# Patient Record
Sex: FEMALE | Race: WHITE | HISPANIC OR LATINO | URBAN - METROPOLITAN AREA
[De-identification: names, ages, dates, MRNs, and addresses within clinical notes are randomized per-mention and may not be internally consistent; named-entity substitution may affect disease eponyms.]

---

## 2019-06-02 ENCOUNTER — EMERGENCY (EMERGENCY)
Facility: HOSPITAL | Age: 24
LOS: 0 days | Discharge: HOME | End: 2019-06-03
Attending: EMERGENCY MEDICINE
Payer: MEDICAID

## 2019-06-02 VITALS
RESPIRATION RATE: 20 BRPM | OXYGEN SATURATION: 99 % | HEIGHT: 62 IN | DIASTOLIC BLOOD PRESSURE: 87 MMHG | TEMPERATURE: 101 F | WEIGHT: 158.07 LBS | HEART RATE: 110 BPM | SYSTOLIC BLOOD PRESSURE: 147 MMHG

## 2019-06-02 DIAGNOSIS — R52 PAIN, UNSPECIFIED: ICD-10-CM

## 2019-06-02 DIAGNOSIS — R10.9 UNSPECIFIED ABDOMINAL PAIN: ICD-10-CM

## 2019-06-02 DIAGNOSIS — R50.9 FEVER, UNSPECIFIED: ICD-10-CM

## 2019-06-02 DIAGNOSIS — N10 ACUTE PYELONEPHRITIS: ICD-10-CM

## 2019-06-02 LAB
APPEARANCE UR: ABNORMAL
APTT BLD: 29.7 SEC — SIGNIFICANT CHANGE UP (ref 28.5–37)
BASOPHILS # BLD AUTO: 0.02 K/UL — SIGNIFICANT CHANGE UP (ref 0–0.2)
BASOPHILS NFR BLD AUTO: 0.2 % — SIGNIFICANT CHANGE UP (ref 0–2)
BILIRUB UR-MCNC: NEGATIVE — SIGNIFICANT CHANGE UP
COLOR SPEC: YELLOW — SIGNIFICANT CHANGE UP
DIFF PNL FLD: ABNORMAL
EOSINOPHIL # BLD AUTO: 0.01 K/UL — SIGNIFICANT CHANGE UP (ref 0–0.5)
EOSINOPHIL NFR BLD AUTO: 0.1 % — SIGNIFICANT CHANGE UP (ref 0–6)
GLUCOSE UR QL: NEGATIVE MG/DL — SIGNIFICANT CHANGE UP
HCT VFR BLD CALC: 39.9 % — SIGNIFICANT CHANGE UP (ref 34.5–45)
HGB BLD-MCNC: 12.9 G/DL — SIGNIFICANT CHANGE UP (ref 11.5–15.5)
IMM GRANULOCYTES NFR BLD AUTO: 0.3 % — SIGNIFICANT CHANGE UP (ref 0–1.5)
INR BLD: 1.25 RATIO — HIGH (ref 0.88–1.16)
KETONES UR-MCNC: NEGATIVE — SIGNIFICANT CHANGE UP
LEUKOCYTE ESTERASE UR-ACNC: ABNORMAL
LYMPHOCYTES # BLD AUTO: 0.88 K/UL — LOW (ref 1–3.3)
LYMPHOCYTES # BLD AUTO: 9.6 % — LOW (ref 13–44)
MCHC RBC-ENTMCNC: 26.2 PG — LOW (ref 27–34)
MCHC RBC-ENTMCNC: 32.3 GM/DL — SIGNIFICANT CHANGE UP (ref 32–36)
MCV RBC AUTO: 81.1 FL — SIGNIFICANT CHANGE UP (ref 80–100)
MONOCYTES # BLD AUTO: 0.47 K/UL — SIGNIFICANT CHANGE UP (ref 0–0.9)
MONOCYTES NFR BLD AUTO: 5.1 % — SIGNIFICANT CHANGE UP (ref 2–14)
NEUTROPHILS # BLD AUTO: 7.78 K/UL — HIGH (ref 1.8–7.4)
NEUTROPHILS NFR BLD AUTO: 84.7 % — HIGH (ref 43–77)
NITRITE UR-MCNC: NEGATIVE — SIGNIFICANT CHANGE UP
NRBC # BLD: 0 /100 WBCS — SIGNIFICANT CHANGE UP (ref 0–0)
PH UR: 7 — SIGNIFICANT CHANGE UP (ref 5–8)
PLATELET # BLD AUTO: 240 K/UL — SIGNIFICANT CHANGE UP (ref 150–400)
PROT UR-MCNC: 30 MG/DL
PROTHROM AB SERPL-ACNC: 14.1 SEC — HIGH (ref 10–12.9)
RBC # BLD: 4.92 M/UL — SIGNIFICANT CHANGE UP (ref 3.8–5.2)
RBC # FLD: 13.3 % — SIGNIFICANT CHANGE UP (ref 10.3–14.5)
SP GR SPEC: 1 — LOW (ref 1.01–1.02)
UROBILINOGEN FLD QL: NEGATIVE MG/DL — SIGNIFICANT CHANGE UP
WBC # BLD: 9.19 K/UL — SIGNIFICANT CHANGE UP (ref 3.8–10.5)
WBC # FLD AUTO: 9.19 K/UL — SIGNIFICANT CHANGE UP (ref 3.8–10.5)

## 2019-06-02 PROCEDURE — 99284 EMERGENCY DEPT VISIT MOD MDM: CPT | Mod: 25

## 2019-06-02 RX ORDER — CEFTRIAXONE 500 MG/1
1 INJECTION, POWDER, FOR SOLUTION INTRAMUSCULAR; INTRAVENOUS ONCE
Refills: 0 | Status: COMPLETED | OUTPATIENT
Start: 2019-06-02 | End: 2019-06-02

## 2019-06-02 RX ORDER — SODIUM CHLORIDE 9 MG/ML
2000 INJECTION INTRAMUSCULAR; INTRAVENOUS; SUBCUTANEOUS ONCE
Refills: 0 | Status: COMPLETED | OUTPATIENT
Start: 2019-06-02 | End: 2019-06-02

## 2019-06-02 RX ORDER — ACETAMINOPHEN 500 MG
975 TABLET ORAL ONCE
Refills: 0 | Status: COMPLETED | OUTPATIENT
Start: 2019-06-02 | End: 2019-06-02

## 2019-06-02 RX ORDER — IOHEXOL 300 MG/ML
30 INJECTION, SOLUTION INTRAVENOUS ONCE
Refills: 0 | Status: COMPLETED | OUTPATIENT
Start: 2019-06-02 | End: 2019-06-02

## 2019-06-02 RX ADMIN — IOHEXOL 30 MILLILITER(S): 300 INJECTION, SOLUTION INTRAVENOUS at 23:44

## 2019-06-02 NOTE — ED PROVIDER NOTE - CLINICAL SUMMARY MEDICAL DECISION MAKING FREE TEXT BOX
fever and dysuria - suspect uti, possibly pyelo. will start fluids and give abx. fever and dysuria - suspect uti, possibly pyelo. will start fluids and give abx. labs reassuring. given non tender, no indication for ct. will start abx. tolerating po and nontoxic. ok for dc home

## 2019-06-02 NOTE — ED PROVIDER NOTE - OBJECTIVE STATEMENT
Pertinent PMH/PSH/FHx/SHx and Review of Systems contained within:  23f no med hx pw generalized body aches, fever, polyuria  and dysuria. symptoms x2 days. nausea without vomiting. notes weakness, feeling like she is going to pass out. no ha ,vison loss, rhinorrhea, rash, bleeding, cp, sob, cough.   patient did not take anything for symptoms  Fh and Sh not otherwise contributory  ROS otherwise negative

## 2019-06-02 NOTE — ED ADULT TRIAGE NOTE - CHIEF COMPLAINT QUOTE
C/O Generalized body aches x 4 days . my belly button feels funny , nausea and vomited x1 today denies any medical history

## 2019-06-02 NOTE — ED ADULT NURSE NOTE - OBJECTIVE STATEMENT
ao x3 , ambulatory to the ED c/o generalized body aches , umbilical area pain with n/v ,. evaluated by ED attending lab drawn and sent , patientdrinking po contrast in preparation for ct abdomen/pelvis with PO AND IV CONTRAST . will continue to monitor

## 2019-06-02 NOTE — ED ADULT NURSE NOTE - NSIMPLEMENTINTERV_GEN_ALL_ED
Implemented All Universal Safety Interventions:  [place] to call system. Call bell, personal items and telephone within reach. Instruct patient to call for assistance. Room bathroom lighting operational. Non-slip footwear when patient is off stretcher. Physically safe environment: no spills, clutter or unnecessary equipment. Stretcher in lowest position, wheels locked, appropriate side rails in place.

## 2019-06-02 NOTE — ED ADULT NURSE NOTE - CAS TRG GENERAL NORM CIRC DET
No visible significant external bleeding/Strong peripheral pulses/Bounding peripheral pulses/Capillary refill less/equal to 2 seconds

## 2019-06-03 VITALS
OXYGEN SATURATION: 98 % | DIASTOLIC BLOOD PRESSURE: 79 MMHG | HEART RATE: 90 BPM | SYSTOLIC BLOOD PRESSURE: 118 MMHG | TEMPERATURE: 100 F | RESPIRATION RATE: 16 BRPM

## 2019-06-03 LAB
ALBUMIN SERPL ELPH-MCNC: 3.8 G/DL — SIGNIFICANT CHANGE UP (ref 3.3–5)
ALP SERPL-CCNC: 73 U/L — SIGNIFICANT CHANGE UP (ref 40–120)
ALT FLD-CCNC: 18 U/L — SIGNIFICANT CHANGE UP (ref 12–78)
ANION GAP SERPL CALC-SCNC: 11 MMOL/L — SIGNIFICANT CHANGE UP (ref 5–17)
AST SERPL-CCNC: 18 U/L — SIGNIFICANT CHANGE UP (ref 15–37)
BACTERIA # UR AUTO: ABNORMAL
BILIRUB SERPL-MCNC: 0.7 MG/DL — SIGNIFICANT CHANGE UP (ref 0.2–1.2)
BLD GP AB SCN SERPL QL: SIGNIFICANT CHANGE UP
BUN SERPL-MCNC: 13 MG/DL — SIGNIFICANT CHANGE UP (ref 7–23)
CALCIUM SERPL-MCNC: 8.3 MG/DL — LOW (ref 8.5–10.1)
CHLORIDE SERPL-SCNC: 102 MMOL/L — SIGNIFICANT CHANGE UP (ref 96–108)
CO2 SERPL-SCNC: 25 MMOL/L — SIGNIFICANT CHANGE UP (ref 22–31)
COMMENT - URINE: SIGNIFICANT CHANGE UP
CREAT SERPL-MCNC: 0.88 MG/DL — SIGNIFICANT CHANGE UP (ref 0.5–1.3)
EPI CELLS # UR: SIGNIFICANT CHANGE UP
GLUCOSE SERPL-MCNC: 100 MG/DL — HIGH (ref 70–99)
HCG SERPL-ACNC: 1 MIU/ML — SIGNIFICANT CHANGE UP
LIDOCAIN IGE QN: 115 U/L — SIGNIFICANT CHANGE UP (ref 73–393)
POTASSIUM SERPL-MCNC: 3.7 MMOL/L — SIGNIFICANT CHANGE UP (ref 3.5–5.3)
POTASSIUM SERPL-SCNC: 3.7 MMOL/L — SIGNIFICANT CHANGE UP (ref 3.5–5.3)
PROT SERPL-MCNC: 8.4 GM/DL — HIGH (ref 6–8.3)
RBC CASTS # UR COMP ASSIST: ABNORMAL /HPF (ref 0–4)
SODIUM SERPL-SCNC: 138 MMOL/L — SIGNIFICANT CHANGE UP (ref 135–145)
WBC UR QL: >50

## 2019-06-03 RX ORDER — CEFUROXIME AXETIL 250 MG
1 TABLET ORAL
Qty: 14 | Refills: 0
Start: 2019-06-03 | End: 2019-06-09

## 2019-06-03 RX ADMIN — CEFTRIAXONE 1 GRAM(S): 500 INJECTION, POWDER, FOR SOLUTION INTRAMUSCULAR; INTRAVENOUS at 01:56

## 2019-06-03 RX ADMIN — SODIUM CHLORIDE 2000 MILLILITER(S): 9 INJECTION INTRAMUSCULAR; INTRAVENOUS; SUBCUTANEOUS at 00:02

## 2019-06-03 RX ADMIN — Medication 975 MILLIGRAM(S): at 01:56

## 2019-06-03 RX ADMIN — SODIUM CHLORIDE 2000 MILLILITER(S): 9 INJECTION INTRAMUSCULAR; INTRAVENOUS; SUBCUTANEOUS at 01:56

## 2019-06-03 RX ADMIN — CEFTRIAXONE 100 GRAM(S): 500 INJECTION, POWDER, FOR SOLUTION INTRAMUSCULAR; INTRAVENOUS at 00:02

## 2019-06-03 RX ADMIN — Medication 975 MILLIGRAM(S): at 00:03

## 2020-01-16 NOTE — ED ADULT NURSE NOTE - NS ED NURSE LEVEL OF CONSCIOUSNESS AFFECT
Detail Level: Detailed Strength: Nataly Post-Care Instructions: I reviewed with the patient in detail post-care instructions. The patient understands that the treated areas should be washed off 8 hours after application. Consent: The patient's consent was obtained including but not limited to risks of crusting, scabbing, scarring, blistering, darker or lighter pigmentary change, recurrence, incomplete removal and infection. Include Z78.9 (Other Specified Conditions Influencing Health Status) As An Associated Diagnosis?: Yes Curette Before Application?: No Medical Necessity Clause: This procedure was medically necessary because the lesions that were treated were: Curette Text: Prior to application of cantharidin the lesion was significantly pared Medical Necessity Information: It is in your best interest to select a reason for this procedure from the list below. All of these items fulfill various CMS LCD requirements except the new and changing color options. Appropriate/Calm

## 2022-02-25 ENCOUNTER — EMERGENCY (EMERGENCY)
Facility: HOSPITAL | Age: 27
LOS: 0 days | Discharge: HOME | End: 2022-02-25
Attending: STUDENT IN AN ORGANIZED HEALTH CARE EDUCATION/TRAINING PROGRAM
Payer: MEDICAID

## 2022-02-25 VITALS
RESPIRATION RATE: 18 BRPM | OXYGEN SATURATION: 99 % | HEIGHT: 62 IN | TEMPERATURE: 99 F | DIASTOLIC BLOOD PRESSURE: 83 MMHG | WEIGHT: 158.07 LBS | SYSTOLIC BLOOD PRESSURE: 145 MMHG | HEART RATE: 61 BPM

## 2022-02-25 VITALS
SYSTOLIC BLOOD PRESSURE: 119 MMHG | OXYGEN SATURATION: 99 % | HEART RATE: 71 BPM | RESPIRATION RATE: 15 BRPM | DIASTOLIC BLOOD PRESSURE: 75 MMHG | TEMPERATURE: 98 F

## 2022-02-25 DIAGNOSIS — K80.50 CALCULUS OF BILE DUCT WITHOUT CHOLANGITIS OR CHOLECYSTITIS WITHOUT OBSTRUCTION: ICD-10-CM

## 2022-02-25 DIAGNOSIS — R10.13 EPIGASTRIC PAIN: ICD-10-CM

## 2022-02-25 DIAGNOSIS — R11.0 NAUSEA: ICD-10-CM

## 2022-02-25 DIAGNOSIS — Z20.822 CONTACT WITH AND (SUSPECTED) EXPOSURE TO COVID-19: ICD-10-CM

## 2022-02-25 DIAGNOSIS — R10.11 RIGHT UPPER QUADRANT PAIN: ICD-10-CM

## 2022-02-25 LAB
ALBUMIN SERPL ELPH-MCNC: 3.4 G/DL — SIGNIFICANT CHANGE UP (ref 3.3–5)
ALP SERPL-CCNC: 73 U/L — SIGNIFICANT CHANGE UP (ref 40–120)
ALT FLD-CCNC: 30 U/L — SIGNIFICANT CHANGE UP (ref 12–78)
ANION GAP SERPL CALC-SCNC: 5 MMOL/L — SIGNIFICANT CHANGE UP (ref 5–17)
APTT BLD: 30.9 SEC — SIGNIFICANT CHANGE UP (ref 27.5–35.5)
AST SERPL-CCNC: 17 U/L — SIGNIFICANT CHANGE UP (ref 15–37)
BASOPHILS # BLD AUTO: 0.02 K/UL — SIGNIFICANT CHANGE UP (ref 0–0.2)
BASOPHILS NFR BLD AUTO: 0.2 % — SIGNIFICANT CHANGE UP (ref 0–2)
BILIRUB SERPL-MCNC: 0.3 MG/DL — SIGNIFICANT CHANGE UP (ref 0.2–1.2)
BLD GP AB SCN SERPL QL: SIGNIFICANT CHANGE UP
BUN SERPL-MCNC: 12 MG/DL — SIGNIFICANT CHANGE UP (ref 7–23)
CALCIUM SERPL-MCNC: 9.1 MG/DL — SIGNIFICANT CHANGE UP (ref 8.5–10.1)
CHLORIDE SERPL-SCNC: 107 MMOL/L — SIGNIFICANT CHANGE UP (ref 96–108)
CO2 SERPL-SCNC: 27 MMOL/L — SIGNIFICANT CHANGE UP (ref 22–31)
CREAT SERPL-MCNC: 0.7 MG/DL — SIGNIFICANT CHANGE UP (ref 0.5–1.3)
EOSINOPHIL # BLD AUTO: 0.08 K/UL — SIGNIFICANT CHANGE UP (ref 0–0.5)
EOSINOPHIL NFR BLD AUTO: 0.8 % — SIGNIFICANT CHANGE UP (ref 0–6)
FLUAV AG NPH QL: SIGNIFICANT CHANGE UP
FLUBV AG NPH QL: SIGNIFICANT CHANGE UP
GLUCOSE SERPL-MCNC: 134 MG/DL — HIGH (ref 70–99)
HCG SERPL-ACNC: <1 MIU/ML — SIGNIFICANT CHANGE UP
HCT VFR BLD CALC: 39.6 % — SIGNIFICANT CHANGE UP (ref 34.5–45)
HGB BLD-MCNC: 12.9 G/DL — SIGNIFICANT CHANGE UP (ref 11.5–15.5)
IMM GRANULOCYTES NFR BLD AUTO: 0.3 % — SIGNIFICANT CHANGE UP (ref 0–1.5)
INR BLD: 1.01 RATIO — SIGNIFICANT CHANGE UP (ref 0.88–1.16)
LIDOCAIN IGE QN: 142 U/L — SIGNIFICANT CHANGE UP (ref 73–393)
LYMPHOCYTES # BLD AUTO: 2.31 K/UL — SIGNIFICANT CHANGE UP (ref 1–3.3)
LYMPHOCYTES # BLD AUTO: 23.2 % — SIGNIFICANT CHANGE UP (ref 13–44)
MCHC RBC-ENTMCNC: 26.7 PG — LOW (ref 27–34)
MCHC RBC-ENTMCNC: 32.6 G/DL — SIGNIFICANT CHANGE UP (ref 32–36)
MCV RBC AUTO: 82 FL — SIGNIFICANT CHANGE UP (ref 80–100)
MONOCYTES # BLD AUTO: 0.35 K/UL — SIGNIFICANT CHANGE UP (ref 0–0.9)
MONOCYTES NFR BLD AUTO: 3.5 % — SIGNIFICANT CHANGE UP (ref 2–14)
NEUTROPHILS # BLD AUTO: 7.18 K/UL — SIGNIFICANT CHANGE UP (ref 1.8–7.4)
NEUTROPHILS NFR BLD AUTO: 72 % — SIGNIFICANT CHANGE UP (ref 43–77)
NRBC # BLD: 0 /100 WBCS — SIGNIFICANT CHANGE UP (ref 0–0)
PLATELET # BLD AUTO: 232 K/UL — SIGNIFICANT CHANGE UP (ref 150–400)
POTASSIUM SERPL-MCNC: 4.6 MMOL/L — SIGNIFICANT CHANGE UP (ref 3.5–5.3)
POTASSIUM SERPL-SCNC: 4.6 MMOL/L — SIGNIFICANT CHANGE UP (ref 3.5–5.3)
PROT SERPL-MCNC: 7.6 GM/DL — SIGNIFICANT CHANGE UP (ref 6–8.3)
PROTHROM AB SERPL-ACNC: 12.1 SEC — SIGNIFICANT CHANGE UP (ref 10.5–13.4)
RBC # BLD: 4.83 M/UL — SIGNIFICANT CHANGE UP (ref 3.8–5.2)
RBC # FLD: 13.2 % — SIGNIFICANT CHANGE UP (ref 10.3–14.5)
SARS-COV-2 RNA SPEC QL NAA+PROBE: SIGNIFICANT CHANGE UP
SODIUM SERPL-SCNC: 139 MMOL/L — SIGNIFICANT CHANGE UP (ref 135–145)
WBC # BLD: 9.97 K/UL — SIGNIFICANT CHANGE UP (ref 3.8–10.5)
WBC # FLD AUTO: 9.97 K/UL — SIGNIFICANT CHANGE UP (ref 3.8–10.5)

## 2022-02-25 PROCEDURE — 99285 EMERGENCY DEPT VISIT HI MDM: CPT

## 2022-02-25 PROCEDURE — 76705 ECHO EXAM OF ABDOMEN: CPT | Mod: 26

## 2022-02-25 RX ORDER — IBUPROFEN 200 MG
1 TABLET ORAL
Qty: 30 | Refills: 0
Start: 2022-02-25 | End: 2022-03-01

## 2022-02-25 RX ORDER — ONDANSETRON 8 MG/1
1 TABLET, FILM COATED ORAL
Qty: 10 | Refills: 0
Start: 2022-02-25 | End: 2022-03-01

## 2022-02-25 RX ORDER — FAMOTIDINE 10 MG/ML
1 INJECTION INTRAVENOUS
Qty: 30 | Refills: 0
Start: 2022-02-25 | End: 2022-03-26

## 2022-02-25 RX ORDER — ONDANSETRON 8 MG/1
4 TABLET, FILM COATED ORAL ONCE
Refills: 0 | Status: COMPLETED | OUTPATIENT
Start: 2022-02-25 | End: 2022-02-25

## 2022-02-25 RX ORDER — KETOROLAC TROMETHAMINE 30 MG/ML
15 SYRINGE (ML) INJECTION ONCE
Refills: 0 | Status: DISCONTINUED | OUTPATIENT
Start: 2022-02-25 | End: 2022-02-25

## 2022-02-25 RX ORDER — FAMOTIDINE 10 MG/ML
20 INJECTION INTRAVENOUS ONCE
Refills: 0 | Status: COMPLETED | OUTPATIENT
Start: 2022-02-25 | End: 2022-02-25

## 2022-02-25 RX ADMIN — ONDANSETRON 4 MILLIGRAM(S): 8 TABLET, FILM COATED ORAL at 09:14

## 2022-02-25 RX ADMIN — Medication 15 MILLIGRAM(S): at 10:50

## 2022-02-25 RX ADMIN — Medication 15 MILLIGRAM(S): at 09:13

## 2022-02-25 RX ADMIN — FAMOTIDINE 20 MILLIGRAM(S): 10 INJECTION INTRAVENOUS at 09:14

## 2022-02-25 NOTE — ED PROVIDER NOTE - OBJECTIVE STATEMENT
26F w/ PMH 26F pw epigastric / RUQ pain radiating into back onset 0100 this AM. Pt describes as severe, squeezing pain, + SOB 2/2 pain. Pt reports nausea, NBNB emesis x 1 this AM. + constipation. Denies F/C, CP, cough, UTI sx, LE pain / swelling. Pt reports hx similar pain in past w/ gallstones.     PMH gallstones, NKDA, no meds, no PSH, LMP 2wks ago.

## 2022-02-25 NOTE — ED ADULT NURSE REASSESSMENT NOTE - NS ED NURSE REASSESS COMMENT FT1
pt seen and re-evaluated by ED attending d/c ready in stable condition ganga removed   d/c instruction given and explained to pt and verbalized understanding pt left Ed ambulatory in NAD

## 2022-02-25 NOTE — ED PROVIDER NOTE - CLINICAL SUMMARY MEDICAL DECISION MAKING FREE TEXT BOX
26F pw 26F pw severe squeezing RUQ pain w/ rad into back onset 0100 this AM. AFVSS. Pt appears uncomfortable but otherwise well appearing. Plan: CBC, CMP, lipase, HCG, T&S, coags, RUQ US, Flu/COVID. Give Toradol, Zofran, Pepcid. Re-eval. Clinical presentation most c/w biliary colic v acute francy.

## 2022-02-25 NOTE — ED PROVIDER NOTE - PROVIDER TOKENS
PROVIDER:[TOKEN:[[id number]],FOLLOWUP:[7-10 Days]],PROVIDER:[TOKEN:[[id number]],FOLLOWUP:[7-10 Days]]

## 2022-02-25 NOTE — ED PROVIDER NOTE - CARE PROVIDER_API CALL
Trey Shipley)  Surgery  [address] [place] [address]  Phone: [phone]  Fax: [phone]  Follow Up Time: 7-10 Days    [facility]  [address], [address]  [place] [address]  Phone: [phone]  Fax: [phone]  Follow Up Time: 7-10 Days

## 2022-02-25 NOTE — ED PROVIDER NOTE - PHYSICAL EXAMINATION
GEN: Awake, alert, interactive, NAD.  HEAD AND NECK: NC/AT. Airway patent. Neck supple.   EYES:  Clear b/l. EOMI. PERRL.   ENT: Moist mucus membranes.   CARDIAC: Regular rate, regular rhythm. No evident pedal edema.    RESP/CHEST: Normal respiratory effort with no use of accessory muscles or retractions. Clear throughout on auscultation.  ABD: Soft, non-distended, non-tender. No rebound, no guarding.   BACK: No midline spinal TTP. No CVAT.   EXTREMITIES: Moving all extremities with no apparent deformities.   SKIN: Warm, dry, intact normal color. No rash.   NEURO: AOx3, CN II-XII grossly intact, no focal deficits.   PSYCH: Appropriate mood and affect. GEN: Awake, alert, interactive, NAD.  HEAD AND NECK: NC/AT. Airway patent. Neck supple.   EYES:  Clear b/l. EOMI. PERRL.   ENT: Moist mucus membranes.   CARDIAC: Regular rate, regular rhythm. No evident pedal edema.    RESP/CHEST: Normal respiratory effort with no use of accessory muscles or retractions. Clear throughout on auscultation.  ABD: Soft, non-distended, + TTP RUQ. No rebound, no guarding.   BACK: No midline spinal TTP. No CVAT.   EXTREMITIES: Moving all extremities with no apparent deformities.   SKIN: Warm, dry, intact normal color. No rash.   NEURO: AOx3, CN II-XII grossly intact, no focal deficits.   PSYCH: Appropriate mood and affect.

## 2022-02-25 NOTE — ED PROVIDER NOTE - PATIENT PORTAL LINK FT
You can access the FollowMyHealth Patient Portal offered by [facility] by registering at the following website: [email or web address]. By joining [email or web address] FollowMyHealth portal, you will also be able to view your health information using other applications (apps) compatible with our system.

## 2022-02-25 NOTE — ED PROVIDER NOTE - PROGRESS NOTE DETAILS
Pt w/ improvement in symptoms w/ ED meds, resting comfortably, in NAD. Partner at bedside. WBC, lipase, LFTs w/o significant abnormalities. RUQ w/ + cholelithiasis, no evidence acute cholecystitis. Stable for d/c home. Given and recommend outpatient f/u w/ Gen Surg, GI. Return signs / symptoms d/w pt. She understands / agrees w/ this plan.

## 2022-11-25 NOTE — ED ADULT NURSE NOTE - BOWEL SOUNDS RUQ
present cr increased from 1.23-->1.42  pt says he had not been drinking enough fluids, will avoid IVF as may worsen Pleural effusion  pt encouraged to drink about 8 glasses of fluids   pt advised to f/u with PMD on Monday for repeat blood work to monitor serum creatinine

## 2023-07-15 ENCOUNTER — EMERGENCY (EMERGENCY)
Facility: HOSPITAL | Age: 28
LOS: 0 days | Discharge: ANOTHER HOSPITAL | End: 2023-07-16
Attending: STUDENT IN AN ORGANIZED HEALTH CARE EDUCATION/TRAINING PROGRAM
Payer: MEDICAID

## 2023-07-15 VITALS
DIASTOLIC BLOOD PRESSURE: 71 MMHG | OXYGEN SATURATION: 99 % | TEMPERATURE: 99 F | RESPIRATION RATE: 18 BRPM | SYSTOLIC BLOOD PRESSURE: 117 MMHG | HEART RATE: 90 BPM

## 2023-07-15 DIAGNOSIS — Z3A.22 22 WEEKS GESTATION OF PREGNANCY: ICD-10-CM

## 2023-07-15 DIAGNOSIS — R10.9 UNSPECIFIED ABDOMINAL PAIN: ICD-10-CM

## 2023-07-15 DIAGNOSIS — O23.02 INFECTIONS OF KIDNEY IN PREGNANCY, SECOND TRIMESTER: ICD-10-CM

## 2023-07-15 DIAGNOSIS — R30.0 DYSURIA: ICD-10-CM

## 2023-07-15 PROCEDURE — 99285 EMERGENCY DEPT VISIT HI MDM: CPT

## 2023-07-16 ENCOUNTER — INPATIENT (INPATIENT)
Facility: HOSPITAL | Age: 28
LOS: 2 days | Discharge: HOME | End: 2023-07-19
Attending: SPECIALIST | Admitting: SPECIALIST
Payer: MEDICAID

## 2023-07-16 VITALS
HEART RATE: 76 BPM | RESPIRATION RATE: 18 BRPM | DIASTOLIC BLOOD PRESSURE: 74 MMHG | OXYGEN SATURATION: 98 % | SYSTOLIC BLOOD PRESSURE: 114 MMHG

## 2023-07-16 VITALS
DIASTOLIC BLOOD PRESSURE: 59 MMHG | TEMPERATURE: 99 F | RESPIRATION RATE: 16 BRPM | SYSTOLIC BLOOD PRESSURE: 127 MMHG | HEART RATE: 65 BPM

## 2023-07-16 DIAGNOSIS — N12 TUBULO-INTERSTITIAL NEPHRITIS, NOT SPECIFIED AS ACUTE OR CHRONIC: ICD-10-CM

## 2023-07-16 DIAGNOSIS — O26.899 OTHER SPECIFIED PREGNANCY RELATED CONDITIONS, UNSPECIFIED TRIMESTER: ICD-10-CM

## 2023-07-16 DIAGNOSIS — N15.9 RENAL TUBULO-INTERSTITIAL DISEASE, UNSPECIFIED: ICD-10-CM

## 2023-07-16 LAB
ALBUMIN SERPL ELPH-MCNC: 2.7 G/DL — LOW (ref 3.3–5)
ALP SERPL-CCNC: 79 U/L — SIGNIFICANT CHANGE UP (ref 40–120)
ALT FLD-CCNC: 13 U/L — SIGNIFICANT CHANGE UP (ref 12–78)
ANION GAP SERPL CALC-SCNC: 9 MMOL/L — SIGNIFICANT CHANGE UP (ref 5–17)
ANISOCYTOSIS BLD QL: SIGNIFICANT CHANGE UP
ANISOCYTOSIS BLD QL: SLIGHT — SIGNIFICANT CHANGE UP
APPEARANCE UR: CLEAR — SIGNIFICANT CHANGE UP
AST SERPL-CCNC: 11 U/L — LOW (ref 15–37)
BACTERIA # UR AUTO: ABNORMAL
BASOPHILS # BLD AUTO: 0 K/UL — SIGNIFICANT CHANGE UP (ref 0–0.2)
BASOPHILS # BLD AUTO: 0.03 K/UL — SIGNIFICANT CHANGE UP (ref 0–0.2)
BASOPHILS NFR BLD AUTO: 0 % — SIGNIFICANT CHANGE UP (ref 0–2)
BASOPHILS NFR BLD AUTO: 0.2 % — SIGNIFICANT CHANGE UP (ref 0–2)
BILIRUB SERPL-MCNC: 0.3 MG/DL — SIGNIFICANT CHANGE UP (ref 0.2–1.2)
BILIRUB UR-MCNC: NEGATIVE — SIGNIFICANT CHANGE UP
BUN SERPL-MCNC: 6 MG/DL — LOW (ref 7–23)
CALCIUM SERPL-MCNC: 8.9 MG/DL — SIGNIFICANT CHANGE UP (ref 8.5–10.1)
CHLORIDE SERPL-SCNC: 105 MMOL/L — SIGNIFICANT CHANGE UP (ref 96–108)
CO2 SERPL-SCNC: 23 MMOL/L — SIGNIFICANT CHANGE UP (ref 22–31)
COLOR SPEC: YELLOW — SIGNIFICANT CHANGE UP
CREAT SERPL-MCNC: 0.61 MG/DL — SIGNIFICANT CHANGE UP (ref 0.5–1.3)
DIFF PNL FLD: ABNORMAL
EGFR: 127 ML/MIN/1.73M2 — SIGNIFICANT CHANGE UP
ELLIPTOCYTES BLD QL SMEAR: SLIGHT — SIGNIFICANT CHANGE UP
ELLIPTOCYTES BLD QL SMEAR: SLIGHT — SIGNIFICANT CHANGE UP
EOSINOPHIL # BLD AUTO: 0 K/UL — SIGNIFICANT CHANGE UP (ref 0–0.5)
EOSINOPHIL # BLD AUTO: 0.03 K/UL — SIGNIFICANT CHANGE UP (ref 0–0.5)
EOSINOPHIL NFR BLD AUTO: 0 % — SIGNIFICANT CHANGE UP (ref 0–6)
EOSINOPHIL NFR BLD AUTO: 0.2 % — SIGNIFICANT CHANGE UP (ref 0–6)
EPI CELLS # UR: SIGNIFICANT CHANGE UP
GLUCOSE SERPL-MCNC: 85 MG/DL — SIGNIFICANT CHANGE UP (ref 70–99)
GLUCOSE UR QL: NEGATIVE MG/DL — SIGNIFICANT CHANGE UP
HCG SERPL-ACNC: 9211 MIU/ML — HIGH
HCT VFR BLD CALC: 28.9 % — LOW (ref 34.5–45)
HCT VFR BLD CALC: 32.1 % — LOW (ref 34.5–45)
HGB BLD-MCNC: 10.2 G/DL — LOW (ref 11.5–15.5)
HGB BLD-MCNC: 9.2 G/DL — LOW (ref 11.5–15.5)
HIV 1 & 2 AB SERPL IA.RAPID: SIGNIFICANT CHANGE UP
IANC: 7.78 K/UL — HIGH (ref 1.8–7.4)
IMM GRANULOCYTES NFR BLD AUTO: 0.5 % — SIGNIFICANT CHANGE UP (ref 0–0.9)
KETONES UR-MCNC: ABNORMAL
LEUKOCYTE ESTERASE UR-ACNC: ABNORMAL
LYMPHOCYTES # BLD AUTO: 16.7 % — SIGNIFICANT CHANGE UP (ref 13–44)
LYMPHOCYTES # BLD AUTO: 2.49 K/UL — SIGNIFICANT CHANGE UP (ref 1–3.3)
LYMPHOCYTES # BLD AUTO: 2.53 K/UL — SIGNIFICANT CHANGE UP (ref 1–3.3)
LYMPHOCYTES # BLD AUTO: 21.7 % — SIGNIFICANT CHANGE UP (ref 13–44)
MANUAL SMEAR VERIFICATION: SIGNIFICANT CHANGE UP
MCHC RBC-ENTMCNC: 23 PG — LOW (ref 27–34)
MCHC RBC-ENTMCNC: 23.5 PG — LOW (ref 27–34)
MCHC RBC-ENTMCNC: 31.8 G/DL — LOW (ref 32–36)
MCHC RBC-ENTMCNC: 31.8 GM/DL — LOW (ref 32–36)
MCV RBC AUTO: 72.3 FL — LOW (ref 80–100)
MCV RBC AUTO: 73.9 FL — LOW (ref 80–100)
MICROCYTES BLD QL: SIGNIFICANT CHANGE UP
MICROCYTES BLD QL: SLIGHT — SIGNIFICANT CHANGE UP
MONOCYTES # BLD AUTO: 0.4 K/UL — SIGNIFICANT CHANGE UP (ref 0–0.9)
MONOCYTES # BLD AUTO: 0.88 K/UL — SIGNIFICANT CHANGE UP (ref 0–0.9)
MONOCYTES NFR BLD AUTO: 3.5 % — SIGNIFICANT CHANGE UP (ref 2–14)
MONOCYTES NFR BLD AUTO: 5.8 % — SIGNIFICANT CHANGE UP (ref 2–14)
NEUTROPHILS # BLD AUTO: 11.65 K/UL — HIGH (ref 1.8–7.4)
NEUTROPHILS # BLD AUTO: 8.4 K/UL — HIGH (ref 1.8–7.4)
NEUTROPHILS NFR BLD AUTO: 72.2 % — SIGNIFICANT CHANGE UP (ref 43–77)
NEUTROPHILS NFR BLD AUTO: 76.6 % — SIGNIFICANT CHANGE UP (ref 43–77)
NEUTS BAND # BLD: 0.9 % — SIGNIFICANT CHANGE UP (ref 0–6)
NITRITE UR-MCNC: NEGATIVE — SIGNIFICANT CHANGE UP
NRBC # BLD: 0 /100 WBCS — SIGNIFICANT CHANGE UP (ref 0–0)
PH UR: 6.5 — SIGNIFICANT CHANGE UP (ref 5–8)
PLAT MORPH BLD: NORMAL — SIGNIFICANT CHANGE UP
PLAT MORPH BLD: NORMAL — SIGNIFICANT CHANGE UP
PLATELET # BLD AUTO: 232 K/UL — SIGNIFICANT CHANGE UP (ref 150–400)
PLATELET # BLD AUTO: 244 K/UL — SIGNIFICANT CHANGE UP (ref 150–400)
PLATELET COUNT - ESTIMATE: NORMAL — SIGNIFICANT CHANGE UP
POIKILOCYTOSIS BLD QL AUTO: SLIGHT — SIGNIFICANT CHANGE UP
POTASSIUM SERPL-MCNC: 4.1 MMOL/L — SIGNIFICANT CHANGE UP (ref 3.5–5.3)
POTASSIUM SERPL-SCNC: 4.1 MMOL/L — SIGNIFICANT CHANGE UP (ref 3.5–5.3)
PROT SERPL-MCNC: 7.5 GM/DL — SIGNIFICANT CHANGE UP (ref 6–8.3)
PROT UR-MCNC: 30 MG/DL
RBC # BLD: 3.91 M/UL — SIGNIFICANT CHANGE UP (ref 3.8–5.2)
RBC # BLD: 4.44 M/UL — SIGNIFICANT CHANGE UP (ref 3.8–5.2)
RBC # FLD: 17.8 % — HIGH (ref 10.3–14.5)
RBC # FLD: 17.9 % — HIGH (ref 10.3–14.5)
RBC BLD AUTO: ABNORMAL
RBC BLD AUTO: NORMAL — SIGNIFICANT CHANGE UP
RBC CASTS # UR COMP ASSIST: ABNORMAL /HPF (ref 0–4)
RH IG SCN BLD-IMP: POSITIVE — SIGNIFICANT CHANGE UP
ROULEAUX BLD QL SMEAR: PRESENT
SCHISTOCYTES BLD QL AUTO: SLIGHT — SIGNIFICANT CHANGE UP
SODIUM SERPL-SCNC: 137 MMOL/L — SIGNIFICANT CHANGE UP (ref 135–145)
SP GR SPEC: 1.01 — SIGNIFICANT CHANGE UP (ref 1.01–1.02)
URATE SERPL-MCNC: 3.9 MG/DL — SIGNIFICANT CHANGE UP (ref 2.5–7)
UROBILINOGEN FLD QL: NEGATIVE MG/DL — SIGNIFICANT CHANGE UP
VARIANT LYMPHS # BLD: 1.7 % — SIGNIFICANT CHANGE UP (ref 0–6)
WBC # BLD: 11.49 K/UL — HIGH (ref 3.8–10.5)
WBC # BLD: 15.19 K/UL — HIGH (ref 3.8–10.5)
WBC # FLD AUTO: 11.49 K/UL — HIGH (ref 3.8–10.5)
WBC # FLD AUTO: 15.19 K/UL — HIGH (ref 3.8–10.5)
WBC UR QL: ABNORMAL

## 2023-07-16 PROCEDURE — 99221 1ST HOSP IP/OBS SF/LOW 40: CPT

## 2023-07-16 PROCEDURE — 76770 US EXAM ABDO BACK WALL COMP: CPT | Mod: 26

## 2023-07-16 PROCEDURE — 76830 TRANSVAGINAL US NON-OB: CPT | Mod: 26

## 2023-07-16 RX ORDER — ACETAMINOPHEN 500 MG
975 TABLET ORAL EVERY 6 HOURS
Refills: 0 | Status: DISCONTINUED | OUTPATIENT
Start: 2023-07-16 | End: 2023-07-19

## 2023-07-16 RX ORDER — CEFTRIAXONE 500 MG/1
1000 INJECTION, POWDER, FOR SOLUTION INTRAMUSCULAR; INTRAVENOUS ONCE
Refills: 0 | Status: COMPLETED | OUTPATIENT
Start: 2023-07-16 | End: 2023-07-16

## 2023-07-16 RX ORDER — KETOROLAC TROMETHAMINE 30 MG/ML
15 SYRINGE (ML) INJECTION ONCE
Refills: 0 | Status: DISCONTINUED | OUTPATIENT
Start: 2023-07-16 | End: 2023-07-16

## 2023-07-16 RX ORDER — HEPARIN SODIUM 5000 [USP'U]/ML
5000 INJECTION INTRAVENOUS; SUBCUTANEOUS EVERY 12 HOURS
Refills: 0 | Status: DISCONTINUED | OUTPATIENT
Start: 2023-07-16 | End: 2023-07-19

## 2023-07-16 RX ORDER — CEFTRIAXONE 500 MG/1
1000 INJECTION, POWDER, FOR SOLUTION INTRAMUSCULAR; INTRAVENOUS EVERY 24 HOURS
Refills: 0 | Status: DISCONTINUED | OUTPATIENT
Start: 2023-07-17 | End: 2023-07-19

## 2023-07-16 RX ORDER — SODIUM CHLORIDE 9 MG/ML
1000 INJECTION INTRAMUSCULAR; INTRAVENOUS; SUBCUTANEOUS ONCE
Refills: 0 | Status: COMPLETED | OUTPATIENT
Start: 2023-07-16 | End: 2023-07-16

## 2023-07-16 RX ADMIN — CEFTRIAXONE 100 MILLIGRAM(S): 500 INJECTION, POWDER, FOR SOLUTION INTRAMUSCULAR; INTRAVENOUS at 03:30

## 2023-07-16 RX ADMIN — SODIUM CHLORIDE 1000 MILLILITER(S): 9 INJECTION INTRAMUSCULAR; INTRAVENOUS; SUBCUTANEOUS at 02:48

## 2023-07-16 RX ADMIN — Medication 975 MILLIGRAM(S): at 22:04

## 2023-07-16 RX ADMIN — HEPARIN SODIUM 5000 UNIT(S): 5000 INJECTION INTRAVENOUS; SUBCUTANEOUS at 19:40

## 2023-07-16 RX ADMIN — CEFTRIAXONE 1000 MILLIGRAM(S): 500 INJECTION, POWDER, FOR SOLUTION INTRAMUSCULAR; INTRAVENOUS at 06:35

## 2023-07-16 RX ADMIN — Medication 975 MILLIGRAM(S): at 21:46

## 2023-07-16 RX ADMIN — Medication 15 MILLIGRAM(S): at 02:48

## 2023-07-16 RX ADMIN — SODIUM CHLORIDE 1000 MILLILITER(S): 9 INJECTION INTRAMUSCULAR; INTRAVENOUS; SUBCUTANEOUS at 03:40

## 2023-07-16 RX ADMIN — Medication 15 MILLIGRAM(S): at 03:41

## 2023-07-16 RX ADMIN — Medication 1 TABLET(S): at 11:46

## 2023-07-16 NOTE — ED PROVIDER NOTE - PHYSICAL EXAMINATION
GENERAL: no acute distress; well-developed  HEAD:  Atraumatic, Normocephalic  EYES: EOMI, PERRLA, conjunctiva and sclera clear  ENT: MMM; oropharynx clear  NECK: Supple, No JVD  CHEST/LUNG: Clear to auscultation bilaterally; No wheeze  HEART: Regular rate and rhythm; No murmurs, rubs, or gallops  ABDOMEN: Soft, Nontender, Nondistended; Bowel sounds present  : +R CVA tenderness   EXTREMITIES:  2+ Peripheral Pulses, No clubbing, cyanosis, or edema  PSYCH: AAOx3  NEUROLOGY: no focal motor or sensory deficits. 5/5 muscle strength in all extremities.   SKIN: No rashes or lesions

## 2023-07-16 NOTE — ED PROVIDER NOTE - PROGRESS NOTE DETAILS
Spoke with Dr. Ahn recommending transfer to [facility] as there is no monitoring capability past 20 weeks gestation at [facility]. Spoke with Transfer center- accepted to L&D Triage. Spoke with Dr. Ahn recommending transfer to [facility] as there is no monitoring capability past 20 weeks gestation at [facility]. Spoke with Transfer center- accepted to L&D Triage. Patient interested in keeping pregnancy  TVUS demonstrating gestation of 22 weeks. Spoke with Dr. Ahn recommending transfer to [facility] as there is no monitoring capability past 20 weeks gestation at [facility]. Spoke with Transfer center- accepted to L&D Triage. Patient interested in keeping pregnancy

## 2023-07-16 NOTE — ED ADULT NURSE NOTE - OBJECTIVE STATEMENT
pt presents to ED complaining of right flank pain radiates to RLQ pain started 2 days ago, nausea, as per pt urine was cloudy, denies fever, constipation, vomitting.  no pmhx, nkda.  Pt has irregualr menstrual cycle, pt unsure if pregnant.

## 2023-07-16 NOTE — ED ADULT NURSE REASSESSMENT NOTE - NS ED NURSE REASSESS COMMENT FT1
Pt endorsed from PM RN. Plan of transfer explained to the patient.   Pt laying in stretcher at this time. Comfortable appearance.   Safety maintained at this time.

## 2023-07-16 NOTE — OB PROVIDER H&P - NSHPPHYSICALEXAM_GEN_ALL_CORE
Tolerated infusion without incident: No adverse reactions noted: No further appt's scheduled:  Will make upon discharge: AVS offered and declined No ICU Vital Signs Last 24 Hrs  T(C): 37.1 (16 Jul 2023 08:56), Max: 37.2 (15 Jul 2023 23:53)  T(F): 98.8 (16 Jul 2023 08:56), Max: 99 (15 Jul 2023 23:53)  HR: 70 (16 Jul 2023 10:05) (65 - 90)  BP: 108/61 (16 Jul 2023 10:05) (98/52 - 127/74)  BP(mean): --  ABP: --  ABP(mean): --  RR: 16 (16 Jul 2023 08:56) (16 - 18)  SpO2: 98% (16 Jul 2023 08:00) (97% - 100%)    Gen: A&O x 3; NAD. Arrives on stretcher from [facility] with smile    Pulm- breathing is unlabored  Abd exam- soft and nontender. Mild R CVA tenderness  Extremities- full range of motion    Abd sono- Images saved to sono- breech; anterior placenta; MVP-4.35; FH @137  TVS- Images saved to sono-3.29-3.62 cm; no funnel/ dynamic change

## 2023-07-16 NOTE — OB PROVIDER H&P - NSLOWPPHRISK_OBGYN_A_OB
No previous uterine incision/[place] Pregnancy/Less than or equal to 4 previous vaginal births/No known bleeding disorder/No history of postpartum hemorrhage/No other PPH risks indicated

## 2023-07-16 NOTE — OB PROVIDER H&P - ASSESSMENT
28yo female  @ 22.1 wks SLIUP by Ultrasound  performed today at [facility] here with Pyelonephritis  -Pt with elevated WBC and mod blood in urine  -Dr Townsend and Dr Hirschberg of [facility]  -pt with likely pyelonephritis  -continue Rocephin (given @ [place] at 3:30a) and renal ultrasound ordered  -pt to start PNV  -pt admitted 28yo female  @ 22.1 wks SLIUP by Ultrasound  performed today at [facility] here with Pyelonephritis  -Pt with elevated WBC and mod blood in urine  -Dr Townsend and Dr Hirschberg of [facility]  -pt with likely pyelonephritis  -continue Rocephin (given @ [place] at 3:30a) and renal ultrasound ordered  -pt to start PNV  -pt admitted  -Ucx sent at [place]

## 2023-07-16 NOTE — OB RN PATIENT PROFILE - FLU SEASON?
You can thicken feeds as needed.  Continue to feed her as much as possible.  Follow up with her pediatrician.  Return to the emergency department if she is having increased trouble breathing, vomiting excessively, is having weakness or other new concerning symptoms.    No

## 2023-07-16 NOTE — ED ADULT TRIAGE NOTE - CHIEF COMPLAINT QUOTE
complaining of right flank pain radiates to RLQ pain started 2 days ago, nausea, as per pt urine was cloudy, denies fever.

## 2023-07-16 NOTE — ED PROVIDER NOTE - OBJECTIVE STATEMENT
28 y/o F hx of cholelithiasis presenting with right flank pain.     States pain started today radiating to right abdomen preceded by two days of hesitancy to empty bladder which she states felt like a previous UTI.   No fever but reports chills. No vomiting or history of kidney stones or hematuria. Denies any hx of STI. 26 y/o F hx of cholelithiasis presenting with right flank pain.     States pain started today radiating to right abdomen preceded by two days of hesitancy to empty bladder which she states felt like a previous UTI. No fever but reports chills. No vomiting or history of kidney stones or hematuria. Denies any hx of STI. 28 y/o F hx of cholelithiasis presenting with right flank pain.     States pain started today radiating to right abdomen preceded by two days of hesitancy to empty bladder which she states felt like a previous UTI. No fever but reports chills. No vomiting or history of kidney stones or hematuria. Denies any hx of STI. No nausea or RUQ pain.

## 2023-07-16 NOTE — OB RN TRIAGE NOTE - CHIEF COMPLAINT QUOTE
transfer from [facility].  Pt presented there with c/o lower back pain on right side.  No pain now.  Pt did not know she was pregnant and was told today at [place] positive preganacy test.

## 2023-07-16 NOTE — ED PROVIDER NOTE - NSICDXPASTMEDICALHX_GEN_ALL_CORE_FT
PAST MEDICAL HISTORY:  No pertinent past medical history      PAST MEDICAL HISTORY:  Cholelithiasis

## 2023-07-16 NOTE — OB PROVIDER TRIAGE NOTE - NSHPLABSRESULTS_GEN_ALL_CORE
Urinalysis Basic - ( 2023 02:45 )    Color: Yellow / Appearance: Clear / S.010 / pH: x  Gluc: 85 mg/dL / Ketone: Trace  / Bili: Negative / Urobili: Negative mg/dL   Blood: x / Protein: 30 mg/dL / Nitrite: Negative   Leuk Esterase: Moderate / RBC: 3-5 /HPF / WBC 26-50   Sq Epi: x / Non Sq Epi: x / Bacteria: Few      137 I 105 I 6  -----------------<85   AST- 11<L>; ALT-13  4.1 I 23 I 0.61                 10.2  15.19>----------<244                32.1

## 2023-07-16 NOTE — PROGRESS NOTE ADULT - ASSESSMENT
A/P: 26yo  at 22w2d (by bedside sono and radiology sono today), who came to [place] ED due to pain with urination and back pain. Exam significant for CVA tenderness and UA appears consistent with urinary infection. Patient with subjective fevers at home and leukocytosis on presentation, consistent with pyelonephritis. Fetal status reassuring at this time with growth consistent with 22w2d fetus and limited anatomy wnl (see ASOBGYN for full report). Patient counseled that pregnancy increases risk for and with pyelonephritis. Therefore, recommend IV antibiotics inpatient until urine culture results.   - Plan for ceftriaxone for presumed pyelo   - Renal ultrasound given blood on UA   - Will follow up urine culture sent from [place]   - Patient counseled on options for pregnancy management given it was an unplanned pregnancy, she desires continuing pregnancy and reports that she will get prenatal care at [place] where she was for her lat two pregnancies. We discussed the importance of getting a complete anatomy scan and prenatal care. Patient expressed concern as she did drink alcohol in the past few months although it was limited. We discussed that some evidence of fetal alcohol syndrome could be seen on an anatomy scan and she should have this done upon discharge. Limited bedside sonogram showed all normal appearing structures at this time and overall risk is low.    Carly Hirschberg, MFM Fellow   Patient seen and evaluated with AMAYA Whpiple Fellow

## 2023-07-16 NOTE — OB PROVIDER TRIAGE NOTE - NSOBPROVIDERNOTE_OBGYN_ALL_OB_FT
28yo female  @ 22.1 wks SLIUP by Ultrasound  performed today at [facility] here with Pyelonephritis  -Pt with elevated WBC and mod blood in urine  -Dr Townsend and Dr Hirschberg of [facility]  -pt with likely pyelonephritis  -continue Rocephin (given @ [place] at 3:30a) and renal ultrasound ordered  -pt to start PNV  -pt admitted

## 2023-07-16 NOTE — OB RN PATIENT PROFILE - EDUCATION PROVIDED ON BREASTFEEDING ASSESSMENT AND INSTRUCTION; INCLUDING POSITIONING, NEWBORN ATTACHMENT, AND COMFORT
Kenalog Preparation: Kenalog Consent: The risks of atrophy were reviewed with the patient. Include Z78.9 (Other Specified Conditions Influencing Health Status) As An Associated Diagnosis?: No Detail Level: Simple X Size Of Lesion In Cm (Optional): 0 Concentration Of Solution Injected (Mg/Ml): 10.5 Total Volume Injected (Ccs- Only Use Numbers And Decimals): .2 Administered By (Optional): Argelia Braswell NP Medical Necessity Clause: This procedure was medically necessary because the lesions that were treated were: Statement Selected

## 2023-07-16 NOTE — ED ADULT NURSE NOTE - CAS DISCH BELONGINGS RETURNED
Not applicable Hydroxyzine Pregnancy And Lactation Text: This medication is not safe during pregnancy and should not be taken. It is also excreted in breast milk and breast feeding isn't recommended.

## 2023-07-16 NOTE — OB RN PATIENT PROFILE - NS_PRENATALHARD_OBGYN_ALL_OB
I tried to call patient about lab results. (did not want to just send letter)    Left message on machine to call back .    When he calls back please give him the following message:    His blood tests all look good (normal cholesterol, kidneys, blood glucose) with the exception of an elevated PSA level.  The PSA blood test was normal 10 years ago, but I have no idea how long this is been elevated.    The PSA is NOT a cancer-specific blood test, it merely measures inflammation in the prostate.  Prostate cancer can make this level elevated, but other causes for an elevated PSA may include age related prostate enlargement, recent sexual activity or ejaculation, infections, bike riding etc..  The degree and speed to which the PSA elevates or an alternate explanation for the elevation will determine most times what further follow up is needed.    In your case, I would recommend referral to Urology CLinic for further evaluation and testing.   There are other blood tests than can be done to determine if this is a significant result or not.   Please make an appointment at your convenience at either these to find urology clinics:      --[facility]  [place] [phone]   [email or web address]     -- [facility] [place] [phone]   [email or web address]    2.  I will let him know the result of the Echocardiogram when it returns.     3.  Return to see me in 3 months to follow-up on all these issues including his blood pressure.   Not Available

## 2023-07-16 NOTE — ED PROVIDER NOTE - CLINICAL SUMMARY MEDICAL DECISION MAKING FREE TEXT BOX
28 y/o F hx of cholelithiasis presenting with right flank pain preceded by 2 days of dysuria  Notable R CVA tenderness; likely pyelonephritis. Afebrile; non-toxic appearing.   +BHCG- patient unaware of pregnancy and confirmed IUP visualized on US  Patient interested in keeping pregnancy.   Covered with CTX, urine culture sent  Transferred to [facility] OB for pyelonephritis in 2nd trimester.

## 2023-07-16 NOTE — OB PROVIDER TRIAGE NOTE - HISTORY OF PRESENT ILLNESS
28yo female  @ 22.1 wks SLIUP with no PNC. Pt presented to [facility] with complaints of difficulty starting a urinary stream and pressure in genital area. Pt also reports some R lower nonradiating back pain for the past 2 days. Pt denies fever/ chills/ nausea/ vomiting. Pt reports urine was slightly pink but denies dysuria. Pt reports she did not know she was pregnant but will go see her ob that took care of her for her 2 other pregnancies.     Ap course complicated by: No PNC; use of marajuana    Pmhx- denies  Pshx/Hosp- denies  Meds- denies  Past ob- 2016-  FT uncomp 6#9                2020- FT 7#0 uncomp                SAB x 2   Gyn- denies fibroids/ STDs/ cysts/ abnl PAPs  Soc- + marijuana use

## 2023-07-16 NOTE — OB PROVIDER TRIAGE NOTE - NSHPPHYSICALEXAM_GEN_ALL_CORE
ICU Vital Signs Last 24 Hrs  T(C): 37.1 (16 Jul 2023 08:56), Max: 37.2 (15 Jul 2023 23:53)  T(F): 98.8 (16 Jul 2023 08:56), Max: 99 (15 Jul 2023 23:53)  HR: 70 (16 Jul 2023 10:05) (65 - 90)  BP: 108/61 (16 Jul 2023 10:05) (98/52 - 127/74)  BP(mean): --  ABP: --  ABP(mean): --  RR: 16 (16 Jul 2023 08:56) (16 - 18)  SpO2: 98% (16 Jul 2023 08:00) (97% - 100%)    Gen: A&O x 3; NAD. Ar[facility] stretcher from [facility] with smile    Pulm- breathing is unlabored  Abd exam- soft and nontender. Mild R CVA tenderness  Extremities- full range of motion    Abd sono- Images saved to sono- breech; anterior placenta; MVP-4.35; FH @137  TVS- Images saved to sono-3.29-3.62 cm; no funnel/ dynamic change

## 2023-07-16 NOTE — OB PROVIDER TRIAGE NOTE - LIVING CHILDREN, OB PROFILE
Group Topic: BH Process Group     Date: 7/20/2021  Start Time: 1030  End Time: 1130  Facilitators: Karen Sneed LCSW    Focus: Process  Number in attendance: 7    Patients were encouraged and joined in a discussion sharing their behaviors, events, and precipitants to their admission, current stressors, and areas of improvement, symptoms of continued concern, and a goal for the day. Pt listened to peers share to help relate experiences. Positive, supportive feedback was encouraged and provided.      Method: Group  Attendance: Present  Participation: Active  Patient Response: Attentive  Mood: Normal  Affect: Type: Euthymic (normal mood)   Range: Full (normal)   Congruency: Congruent   Stability: Stable  Behavior/Socialization: Engaged  Thought Process: Focused  Task Performance: Follows directions  Patient Evaluation: Independent - full participation     Pt participated and engaged in group well. Pt shared feeling \"good\" and ready for discharge. Pt shared reflections of tx, and discussed stressors related to grandma. Pt also shared trauma therapy and goals for such. Pt was open and forthcoming.          2

## 2023-07-17 ENCOUNTER — ASOB RESULT (OUTPATIENT)
Age: 28
End: 2023-07-17

## 2023-07-17 ENCOUNTER — TRANSCRIPTION ENCOUNTER (OUTPATIENT)
Age: 28
End: 2023-07-17

## 2023-07-17 ENCOUNTER — APPOINTMENT (OUTPATIENT)
Dept: ANTEPARTUM | Facility: CLINIC | Age: 28
End: 2023-07-17
Payer: MEDICAID

## 2023-07-17 LAB
BASOPHILS # BLD AUTO: 0.03 K/UL — SIGNIFICANT CHANGE UP (ref 0–0.2)
BASOPHILS NFR BLD AUTO: 0.3 % — SIGNIFICANT CHANGE UP (ref 0–2)
CULTURE RESULTS: SIGNIFICANT CHANGE UP
EOSINOPHIL # BLD AUTO: 0.14 K/UL — SIGNIFICANT CHANGE UP (ref 0–0.5)
EOSINOPHIL NFR BLD AUTO: 1.6 % — SIGNIFICANT CHANGE UP (ref 0–6)
HCT VFR BLD CALC: 30.2 % — LOW (ref 34.5–45)
HGB BLD-MCNC: 9.4 G/DL — LOW (ref 11.5–15.5)
IANC: 5.13 K/UL — SIGNIFICANT CHANGE UP (ref 1.8–7.4)
IMM GRANULOCYTES NFR BLD AUTO: 0.2 % — SIGNIFICANT CHANGE UP (ref 0–0.9)
LYMPHOCYTES # BLD AUTO: 2.77 K/UL — SIGNIFICANT CHANGE UP (ref 1–3.3)
LYMPHOCYTES # BLD AUTO: 31.9 % — SIGNIFICANT CHANGE UP (ref 13–44)
MCHC RBC-ENTMCNC: 22.6 PG — LOW (ref 27–34)
MCHC RBC-ENTMCNC: 31.1 GM/DL — LOW (ref 32–36)
MCV RBC AUTO: 72.6 FL — LOW (ref 80–100)
MONOCYTES # BLD AUTO: 0.59 K/UL — SIGNIFICANT CHANGE UP (ref 0–0.9)
MONOCYTES NFR BLD AUTO: 6.8 % — SIGNIFICANT CHANGE UP (ref 2–14)
NEUTROPHILS # BLD AUTO: 5.13 K/UL — SIGNIFICANT CHANGE UP (ref 1.8–7.4)
NEUTROPHILS NFR BLD AUTO: 59.2 % — SIGNIFICANT CHANGE UP (ref 43–77)
NRBC # BLD: 0 /100 WBCS — SIGNIFICANT CHANGE UP (ref 0–0)
NRBC # FLD: 0 K/UL — SIGNIFICANT CHANGE UP (ref 0–0)
PLATELET # BLD AUTO: 244 K/UL — SIGNIFICANT CHANGE UP (ref 150–400)
RBC # BLD: 4.16 M/UL — SIGNIFICANT CHANGE UP (ref 3.8–5.2)
RBC # FLD: 17.8 % — HIGH (ref 10.3–14.5)
SPECIMEN SOURCE: SIGNIFICANT CHANGE UP
WBC # BLD: 8.68 K/UL — SIGNIFICANT CHANGE UP (ref 3.8–10.5)
WBC # FLD AUTO: 8.68 K/UL — SIGNIFICANT CHANGE UP (ref 3.8–10.5)

## 2023-07-17 PROCEDURE — 71045 X-RAY EXAM CHEST 1 VIEW: CPT | Mod: 26

## 2023-07-17 PROCEDURE — 99232 SBSQ HOSP IP/OBS MODERATE 35: CPT | Mod: GC,25

## 2023-07-17 PROCEDURE — 76805 OB US >/= 14 WKS SNGL FETUS: CPT | Mod: 26

## 2023-07-17 RX ORDER — FERROUS SULFATE 325(65) MG
325 TABLET ORAL DAILY
Refills: 0 | Status: DISCONTINUED | OUTPATIENT
Start: 2023-07-17 | End: 2023-07-19

## 2023-07-17 RX ADMIN — Medication 975 MILLIGRAM(S): at 16:25

## 2023-07-17 RX ADMIN — HEPARIN SODIUM 5000 UNIT(S): 5000 INJECTION INTRAVENOUS; SUBCUTANEOUS at 11:34

## 2023-07-17 RX ADMIN — CEFTRIAXONE 100 MILLIGRAM(S): 500 INJECTION, POWDER, FOR SOLUTION INTRAMUSCULAR; INTRAVENOUS at 02:51

## 2023-07-17 RX ADMIN — Medication 325 MILLIGRAM(S): at 16:25

## 2023-07-17 RX ADMIN — HEPARIN SODIUM 5000 UNIT(S): 5000 INJECTION INTRAVENOUS; SUBCUTANEOUS at 22:40

## 2023-07-17 RX ADMIN — Medication 975 MILLIGRAM(S): at 17:25

## 2023-07-17 RX ADMIN — Medication 1 TABLET(S): at 11:34

## 2023-07-17 NOTE — PROGRESS NOTE ADULT - ASSESSMENT
at 22w3d (by bedside sono and radiology sono today) transfer from [place] ED due to pain with urination and back pain and bacteria/mod leukocyte esterase/blood on UA c/f pyelonephritis. Patient currently on ceftriaxone (-) and feels well this morning without dysuria or subjective fevers or chills.    #Suspected pyelonephritis  - Renal ultrasound (): no hydronephrosis  - WBC downtrending: 15->11  - CTX (-)  - Tylenol PRN  - f/u AM CBC  - f/u urine culture (sent at )    # Fetal wellbeing  - Daily FH check  - Patient plans on obtaining PNC from [facility] where she delivered her two children    #Maternal wellbeing  - HSQ  - Reg marcin Hemphill, PGY3  at 22w3d (by bedside sono and radiology sono today) transfer from [place] ED due to pain with urination and back pain and bacteria/mod leukocyte esterase/blood on UA c/f pyelonephritis. Patient currently on ceftriaxone (-) and feels well this morning without dysuria or subjective fevers or chills.    #Suspected pyelonephritis  - Renal ultrasound (): no hydronephrosis  - WBC downtrending: 15->11  - CTX (-)  - Tylenol PRN  - f/u AM CBC  - f/u urine culture (sent at )    # Fetal wellbeing  - Daily FH check  - Patient plans on obtaining PNC from [facility] where she delivered her two children    #Maternal wellbeing  - HSQ  - Reg diet    SATYA Hemphill, PGY3    ---------------------  MFM Fellow Addendum     26 yo  at 22w3d dated by second trimester ultrasound admitted with pyelonephritis, diagnosed with UA suggestive of UTI, CVA tenderness on exam and subjective fevers/leukocytosis. Currently on IV antibiotics while awaiting urine culture results. Renal ultrasound demonstrates no obstructive stone and physiologic right hydronephrosis. Patient afebrile with resolution of leukocytosis and marked improvement in CVA tenderness. Continue inpatient treatment with transition to oral antibiotics following results of urine culture sensitivities On discharge will establish prenatal care for completion of anatomy scan (patient confirms this is a desired pregnancy), patient counseled on need for continued antibiotic use for suppression of pyelonephritis for remaining duration of pregnancy. Offer CXR for positive quantiferon.     Patient seen and counseled with Dr. Treadwell. Mer Mari, PGY-6       at 22w3d (by bedside sono and radiology sono today) transfer from [place] ED due to pain with urination and back pain and bacteria/mod leukocyte esterase/blood on UA c/f pyelonephritis. Patient currently on ceftriaxone (-) and feels well this morning without dysuria or subjective fevers or chills.    #Suspected pyelonephritis  - Renal ultrasound (): no hydronephrosis  - WBC downtrending: 15->11  - CTX (-)  - Tylenol PRN  - f/u AM CBC  - f/u urine culture (sent at )    # Fetal wellbeing  - Daily  check  - Patient plans on obtaining PNC from [facility] where she delivered her two children    #Maternal wellbeing  - HSQ  - Reg diet    SATYA Hemphill, PGY3    ---------------------  MFM Fellow Addendum     26 yo  at 22w3d dated by second trimester ultrasound admitted with right pyelonephritis, diagnosed with UA suggestive of UTI, CVA tenderness on exam and subjective fevers/leukocytosis. Currently on IV antibiotics while awaiting urine culture results. Renal ultrasound demonstrates no obstructive stone and physiologic right hydronephrosis. Patient afebrile with resolution of leukocytosis and marked improvement in CVA tenderness. Continue inpatient treatment with transition to oral antibiotics following results of urine culture sensitivities On discharge will establish prenatal care for completion of anatomy scan (patient confirms this is a desired pregnancy), patient counseled on need for continued antibiotic use for suppression of pyelonephritis for remaining duration of pregnancy. Offer CXR for positive quantiferon.     Patient seen and counseled with Dr. Treadwell. Mer Mari, PGY-6

## 2023-07-17 NOTE — SBIRT NOTE ADULT - NSSBIRTDRGBRIEFINTDET_GEN_A_CORE
Patient reports she intends to stop smoking marijuana now that she is aware of pregnancy. Patient encouraged to continue to practice safe use in the future and to make outreach if additional support is needed.

## 2023-07-17 NOTE — DISCHARGE NOTE ANTEPARTUM - PROVIDER TOKENS
FREE:[LAST:[[facility]],PHONE:[phone]],FAX:[(   )    -],ADDRESS:[[address] [place] [address]]]

## 2023-07-17 NOTE — DISCHARGE NOTE ANTEPARTUM - HOSPITAL COURSE
28 yo  at 22w3d dated by second trimester ultrasound admitted with pyelonephritis, diagnosed with UA suggestive of UTI, CVA tenderness on exam and subjective fevers/leukocytosis. Currently on IV antibiotics while awaiting urine culture results. Renal ultrasound demonstrates no obstructive stone and physiologic right hydronephrosis. Patient afebrile with resolution of leukocytosis and marked improvement in CVA tenderness. Continue inpatient treatment with transition to oral antibiotics following results of urine culture sensitivities On discharge will establish prenatal care for completion of anatomy scan (patient confirms this is a desired pregnancy), patient counseled on need for continued antibiotic use for suppression of pyelonephritis for remaining duration of pregnancy. Offer CXR for positive quantiferon.  26 yo  at 22w3d dated by second trimester ultrasound admitted with pyelonephritis, diagnosed with UA suggestive of UTI, CVA tenderness on exam and subjective fevers/leukocytosis. Currently on IV antibiotics while awaiting urine culture results. Renal ultrasound demonstrates no obstructive stone and physiologic right hydronephrosis. Patient afebrile with resolution of leukocytosis and marked improvement in CVA tenderness. Continue inpatient treatment with transition to oral antibiotics following results of urine culture sensitivities On discharge will establish prenatal care for completion of anatomy scan (patient confirms this is a desired pregnancy), patient counseled on need for continued antibiotic use for suppression of pyelonephritis for remaining duration of pregnancy. Offer CXR for positive quantiferon, however pt declining now stating she will perform after pregnancy.    Plan: transition patient to PO bactrim (to be started in AM of ) twice a day for 10 days (to complete total 14 day course of antibiotics), with suppression with Macrobid 100mg once daily for the remainder of the pregnancy. 28 yo  at 22w3d dated by second trimester ultrasound admitted with pyelonephritis, diagnosed with UA suggestive of UTI, CVA tenderness on exam and subjective fevers/leukocytosis. Currently on IV antibiotics while awaiting urine culture results. Renal ultrasound demonstrates no obstructive stone and physiologic right hydronephrosis. Patient afebrile with resolution of leukocytosis and marked improvement in CVA tenderness. Continue inpatient treatment with transition to oral antibiotics following results of urine culture sensitivities On discharge will establish prenatal care for completion of anatomy scan (patient confirms this is a desired pregnancy), patient counseled on need for continued antibiotic use for suppression of pyelonephritis for remaining duration of pregnancy. Offer CXR for positive quantiferon, negative.    Plan: transition patient to PO bactrim (to be started in AM of ) twice a day for 10 days (to complete total 14 day course of antibiotics), with suppression with Macrobid 100mg once daily for the remainder of the pregnancy.

## 2023-07-17 NOTE — CHART NOTE - NSCHARTNOTEFT_GEN_A_CORE
Spoke to [facility] at [phone]. Informed clinic patient will need first OB appt and follow up from [facility] stay for pyelonephritis. [facility] to call pt with apt. for 1-2 weeks from now.     Will follow up with patient to ensure she scheduled.     Mera GIL-BC

## 2023-07-17 NOTE — DISCHARGE NOTE ANTEPARTUM - MEDICATION SUMMARY - MEDICATIONS TO STOP TAKING
I will STOP taking the medications listed below when I get home from the hospital:     mg oral tablet  -- 1 tab(s) by mouth every 4 to 6 hours, As Needed -for moderate pain   -- Do not take this drug if you are pregnant.  It is very important that you take or use this exactly as directed.  Do not skip doses or discontinue unless directed by your doctor.  May cause drowsiness or dizziness.  Obtain medical advice before taking any non-prescription drugs as some may affect the action of this medication.  Take with food or milk.    cefuroxime 500 mg oral tablet  -- 1 tab(s) by mouth 2 times a day x 7 days   -- Finish all this medication unless otherwise directed by prescriber.  Medication should be taken with plenty of water.  Take with food or milk.

## 2023-07-17 NOTE — DISCHARGE NOTE ANTEPARTUM - NS MD DC FALL RISK RISK
For information on Fall & Injury Prevention, visit: [email or web address] OR  [email or web address] OR  https://www.cdc.gov/steadi/patient.html

## 2023-07-17 NOTE — DISCHARGE NOTE ANTEPARTUM - CARE PROVIDER_API CALL
[facility],   [address] [facility] [address]  Phone: [phone]  Fax: (   )    -  Follow Up Time:

## 2023-07-17 NOTE — DISCHARGE NOTE ANTEPARTUM - PATIENT PORTAL LINK FT
You can access the FollowMyHealth Patient Portal offered by [facility] by registering at the following website: [email or web address]. By joining [facility]’s FollowMyHealth portal, you will also be able to view your health information using other applications (apps) compatible with our system.

## 2023-07-17 NOTE — DISCHARGE NOTE ANTEPARTUM - ADDITIONAL INSTRUCTIONS
Follow up with [facility] within one week  Take all antibiotics as prescribed (complete course of Bactrim; upon completion start Macrobid once daily for duration of pregnancy)

## 2023-07-17 NOTE — DISCHARGE NOTE ANTEPARTUM - PLAN OF CARE
- Follow up with you OB doctor within one week  - Return with fever, worsening pain, contractions, vaginal bleeding, leaking fluid or decreased fetal movement  - Complete all antibiotics as prescribed - Follow up with you OB doctor within one week ( Called [facility] for OB appointment)  - Return with fever, worsening pain, contractions, vaginal bleeding, leaking fluid or decreased fetal movement  -Take all antibiotics as prescribed (complete course of Bactrim; upon completion start Macrobid once daily for duration of pregnancy)

## 2023-07-17 NOTE — DISCHARGE NOTE ANTEPARTUM - MEDICATION SUMMARY - MEDICATIONS TO TAKE
I will START or STAY ON the medications listed below when I get home from the hospital:    Pepcid 20 mg oral tablet  -- 1 tab(s) by mouth once a day   -- It is very important that you take or use this exactly as directed.  Do not skip doses or discontinue unless directed by your doctor.  Obtain medical advice before taking any non-prescription drugs as some may affect the action of this medication.    -- Indication: For home med    Prenatal Multivitamins with Folic Acid 1 mg oral tablet  -- 1 tab(s) by mouth once a day  -- Indication: For Pregnancy    ferrous sulfate 325 mg (65 mg elemental iron) oral tablet  -- 1 tab(s) by mouth once a day  -- Indication: For anemia    sulfamethoxazole-trimethoprim 800 mg-160 mg oral tablet  -- 1 tab(s) by mouth 2 times a day  -- Indication: For Pyelonephritis    Macrobid 100 mg oral capsule  -- 1 cap(s) by mouth once a day To start once a day after completion of Bactrim for suppression of UTIs during pregnancy  -- Indication: For Suppression of UTI during pregnancy

## 2023-07-17 NOTE — SBIRT NOTE ADULT - NSSBIRTALCPOSREINDET_GEN_A_CORE
Patient reports she intends to stop drinking now that she is aware of pregnancy. Patient encouraged to continue to practice safe use in the future and to make outreach if additional support is needed.

## 2023-07-17 NOTE — DISCHARGE NOTE ANTEPARTUM - CARE PLAN
Assessment and plan of treatment:	- Follow up with you OB doctor within one week  - Return with fever, worsening pain, contractions, vaginal bleeding, leaking fluid or decreased fetal movement  - Complete all antibiotics as prescribed   1 Principal Discharge DX:	Pyelonephritis  Assessment and plan of treatment:	- Follow up with you OB doctor within one week ( Called [facility] for OB appointment)  - Return with fever, worsening pain, contractions, vaginal bleeding, leaking fluid or decreased fetal movement  -Take all antibiotics as prescribed (complete course of Bactrim; upon completion start Macrobid once daily for duration of pregnancy)

## 2023-07-18 LAB — RH IG SCN BLD-IMP: POSITIVE — SIGNIFICANT CHANGE UP

## 2023-07-18 PROCEDURE — 99232 SBSQ HOSP IP/OBS MODERATE 35: CPT | Mod: GC,25

## 2023-07-18 RX ADMIN — Medication 1 TABLET(S): at 09:44

## 2023-07-18 RX ADMIN — CEFTRIAXONE 100 MILLIGRAM(S): 500 INJECTION, POWDER, FOR SOLUTION INTRAMUSCULAR; INTRAVENOUS at 04:00

## 2023-07-18 RX ADMIN — Medication 325 MILLIGRAM(S): at 09:40

## 2023-07-18 NOTE — PROGRESS NOTE ADULT - ASSESSMENT
at 22w4d (by bedside sono and radiology sono today) transfer from [place] ED due to pain with urination and back pain and bacteria/mod leukocyte esterase/blood on UA c/f pyelonephritis. Patient currently on ceftriaxone (-) and feels well this morning without dysuria or subjective fevers or chills. Urine culture () growing staph saprophyticus likely sensitive to Macrobid.    # pyelonephritis  - Urine Cx (): staph saprophyticus likely sensitive to Macrobid  - Renal ultrasound (): no hydronephrosis  - WBC downtrending: 15->11->8.7  - CTX (-). Transition to po today  - Tylenol PRN    # Fetal wellbeing  - Patient plans to follow up at [facility]. Spoke to [facility]  requesting appointment for patient.    #Maternal wellbeing  - HSQ  - Reg diet    SATYA Hemphill, PGY3  at 22w4d (by bedside sono and radiology sono today) transfer from [place] ED due to pain with urination and back pain and bacteria/mod leukocyte esterase/blood on UA c/f pyelonephritis. Patient currently on ceftriaxone (-) and feels well this morning without dysuria or subjective fevers or chills. Urine culture () growing staph saprophyticus likely sensitive to Macrobid.    # pyelonephritis  - Urine Cx (): staph saprophyticus likely sensitive to Macrobid  - Renal ultrasound (): no hydronephrosis  - WBC downtrending: 15->11->8.7  - CTX (-). Transition to po today  - Tylenol PRN    # Fetal wellbeing  - Patient plans to follow up at [facility]. Spoke to [facility]  requesting appointment for patient.    #Maternal wellbeing  - HSQ  - Reg diet    SATYA Hemphill, PGY3    ---------------  MFM Fellow Addendum    26 yo  at 22w4d admitted for right pyelonephritis, with urine culture growing staph saprophyticus (assumed to be pan-sensitive). Patient clinically improving on IV ceftriaxone (afebrile, resolved leukocytosis, resolved right CVA tenderness). Will transition to PO antibiotics today; of note, do NOT recommend macrobid as this is a bacteriostatic medication and is insufficient for treatment of complicated UTI/pyelo. Will plan for transition to bactrim with suppression with macrobid for remaining duration of pregnancy. Patient to establish prenatal care with [facility], which is where she had her prior deliveries. CXR performed for positive quantiferon negative. Anticipate discharge to home later today.     Mer Mari, PGY-6   Patient seen and counseled with Dr. Treadwell       at 22w4d (by bedside sono and radiology sono today) transfer from [place] ED due to pain with urination and back pain and bacteria/mod leukocyte esterase/blood on UA c/f pyelonephritis. Patient currently on ceftriaxone (-) and feels well this morning without dysuria or subjective fevers or chills. Urine culture () growing staph saprophyticus likely sensitive to Macrobid.    # pyelonephritis  - Urine Cx (): staph saprophyticus likely sensitive to Macrobid  - Renal ultrasound (): no hydronephrosis  - WBC downtrending: 15->11->8.7  - CTX (-). Transition to po today  - Tylenol PRN    # Fetal wellbeing  - Patient plans to follow up at [facility]. Spoke to [facility]  requesting appointment for patient.    #Maternal wellbeing  - HSQ  - Reg diet    SATYA Hepmhill, PGY3    ---------------  MFM Fellow Addendum    28 yo  at 22w4d admitted for right pyelonephritis, with urine culture growing staph saprophyticus (assumed to be pan-sensitive). Patient clinically improving on IV ceftriaxone (afebrile, resolved leukocytosis, improved but still present right CVA tenderness). Will administer one additional dose of IV antibiotics and transition to PO antibiotics tomorrow; of note, do NOT recommend macrobid as this is a bacteriostatic medication and is insufficient for treatment of complicated UTI/pyelo. Will plan for transition to bactrim (to complete total of 14-day course) with suppression with macrobid for remaining duration of pregnancy. Patient to establish prenatal care with [facility], which is where she had her prior deliveries. CXR performed for positive quantiferon negative. Anticipate discharge to home tomorrow if remains clinically well.     Mer Mari, PGY-6   Patient seen and counseled with Dr. Treadwell

## 2023-07-19 VITALS
SYSTOLIC BLOOD PRESSURE: 119 MMHG | DIASTOLIC BLOOD PRESSURE: 56 MMHG | HEART RATE: 74 BPM | TEMPERATURE: 98 F | RESPIRATION RATE: 16 BRPM | OXYGEN SATURATION: 100 %

## 2023-07-19 PROCEDURE — 99238 HOSP IP/OBS DSCHRG MGMT 30/<: CPT | Mod: GC

## 2023-07-19 RX ORDER — FERROUS SULFATE 325(65) MG
1 TABLET ORAL
Qty: 0 | Refills: 0 | DISCHARGE
Start: 2023-07-19

## 2023-07-19 RX ORDER — NITROFURANTOIN MACROCRYSTAL 50 MG
1 CAPSULE ORAL
Qty: 30 | Refills: 4
Start: 2023-07-19 | End: 2023-12-15

## 2023-07-19 RX ADMIN — Medication 325 MILLIGRAM(S): at 11:00

## 2023-07-19 RX ADMIN — CEFTRIAXONE 100 MILLIGRAM(S): 500 INJECTION, POWDER, FOR SOLUTION INTRAMUSCULAR; INTRAVENOUS at 03:58

## 2023-07-19 RX ADMIN — Medication 1 TABLET(S): at 11:00

## 2023-07-19 NOTE — PROGRESS NOTE ADULT - ASSESSMENT
at 22w5d (by bedside sono and radiology sono today) transfer from [place] ED due to pain with urination and back pain and bacteria/mod leukocyte esterase/blood on UA c/f pyelonephritis. Patient currently on ceftriaxone (-) and feels well this morning without dysuria or subjective fevers or chills. Urine culture () growing staph saprophyticus likely sensitive to Bactrim and Macrobid.    # pyelonephritis  - Urine Cx (): staph saprophyticus likely sensitive to Bactrim and Macrobid  - Renal ultrasound (): no hydronephrosis  - WBC downtrending: 15->11->8.7  - CTX (-). Transition to po Bactrim today for 11 days and then transition to daily Macrobid suppression  - Tylenol PRN    # Fetal wellbeing  - Patient plans to follow up at [facility]. Spoke to [facility]  requesting appointment for patient.    #Maternal wellbeing  - HSQ  - Reg diet    SATYA Hemphill, PGY3  at 22w5d (by bedside sono and radiology sono today) transfer from [place] ED due to pain with urination and back pain and bacteria/mod leukocyte esterase/blood on UA c/f pyelonephritis. Patient currently on ceftriaxone (-) and feels well this morning without dysuria or subjective fevers or chills. Urine culture () growing staph saprophyticus likely sensitive to Bactrim and Macrobid.    # pyelonephritis  - Urine Cx (): staph saprophyticus likely sensitive to Bactrim and Macrobid  - Renal ultrasound (): no hydronephrosis  - WBC downtrending: 15->11->8.7  - CTX (-). Transition to po Bactrim today for 10 days and then transition to daily Macrobid suppression  - Tylenol PRN    # Fetal wellbeing  - Patient plans to follow up at [facility]. Spoke to [facility]  requesting appointment for patient.    #Maternal wellbeing  - HSQ  - Reg diet    SATYA Hemphill, PGY3

## 2023-07-19 NOTE — PROGRESS NOTE ADULT - ATTENDING COMMENTS
I have personally seen and counseled this patient.  I have fully participated in the care of this patient. I have reviewed all pertinent clinical information, including history, physical exam, plan and the Fellow's note and agree except as noted.  The patient indicated she understands and agrees with the plan of care.
MFM ATTENDING    Pyelonephritis  cva tenderness completely resolved today  remains afebrile  can dc home today  continue bactrim to complete 2 week course, followed by macrobid suppression.   wants to follow at [facility]. [employer].   All questions answered to her and partners satisfaction.
MFM ATTENDING    22w3d pyelonephritis    wbc improving  afebrile  cva tenderness improved this am  ucx >100k gram positives    likely strep or staphylococcus organisms.   renal sono no hydro, no abscess    continue ceftriaxone  cxr   consider transfer to po once cva tenderness resolved completely and sensitivities resulted  desires f/u at [facility] for prenatal care - team notified their clinic, they will arrange appt for patient.   discussed treatment with po abx to complete 2 week course followed by suppression for remainder of pregnancy.  All questions answered to her satisfaction.
MFM ATTENDING    pyelonephritis  still with cva tenderness, improving.   continue inpatient management, IV abx.   wants to f/u at [place]. team notified their clinic, they will arrange appt for patient.

## 2023-07-19 NOTE — CHART NOTE - NSCHARTNOTEFT_GEN_A_CORE
Called back to [facility] patient booked for an appointment for Tuesday July 25th at 10:20am for OB clinic. Spoke to clinic about diagnosis and follow up. Pt to see Dr. Lisseth Whitlock, faxed records to office at     Patient can call [phone] if running late or need to reschedule appointment.     Mera ROWLAND-BC Called back to [facility] patient booked for an appointment for Tuesday July 25th at 10:20am for OB clinic. Spoke to clinic about diagnosis and follow up. Pt to see Dr. Lisseth Whitlock, faxed records to office at [phone].    Patient informed of appointment, she can call [phone] if running late or need to reschedule appointment.     Mera GIL-BC

## 2023-07-19 NOTE — PROGRESS NOTE ADULT - SUBJECTIVE AND OBJECTIVE BOX
Patient seen and examined at bedside, no acute overnight events. No acute complaints. Patient endorses good fetal movement. Patient is ambulating and tolerating regular diet. Denies CP, SOB, N/V, fevers, chills, or any other concerns.    Vital Signs Last 24 Hours  T(C): 36.8 (07-19-23 @ 05:11), Max: 37.1 (07-19-23 @ 01:04)  HR: 69 (07-19-23 @ 05:14) (50 - 75)  BP: 103/55 (07-19-23 @ 05:14) (103/55 - 116/57)  RR: 17 (07-19-23 @ 05:11) (16 - 18)  SpO2: 100% (07-19-23 @ 05:11) (99% - 100%)    I&O's Summary    17 Jul 2023 07:01  -  18 Jul 2023 07:00  --------------------------------------------------------  IN: 0 mL / OUT: 1750 mL / NET: -1750 mL      Culture Results:   >100,000 CFU/ml Staphylococcus saprophyticus   ***********Note************ Routine susceptibility testing of these urine   isolates is not advised. Due to the high antibiotic concentration   achieved in urine,   uncomplicated urinary tract infections can be treated with commonly used   antibiotics (e.g. Nitrofurantoin, Trimethoprim/Sulfamethoxazole or   Fluoroquinolones). (07.16.23 @ 02:45)       Physical Exam:  General: NAD  CV: RR  Lungs: breathing comfortably on RA  Back: No CVA tenderness  Abdomen: soft, gravid, non-tender  Ext: no pain or swelling    Labs:             9.4<L>  8.68  )-----------( 244      ( 07-17 @ 05:16 )             30.2<L>               9.2<L>  11.49<H> )-----------( 232      ( 07-16 @ 11:08 )             28.9<L>               10.2<L>  15.19<H> )-----------( 244      ( 07-16 @ 02:45 )             32.1<L>        MEDICATIONS  (STANDING):  cefTRIAXone   IVPB 1000 milliGRAM(s) IV Intermittent every 24 hours  ferrous    sulfate 325 milliGRAM(s) Oral daily  heparin   Injectable 5000 Unit(s) SubCutaneous every 12 hours  prenatal multivitamin 1 Tablet(s) Oral daily    MEDICATIONS  (PRN):  acetaminophen     Tablet .. 975 milliGRAM(s) Oral every 6 hours PRN Temp greater or equal to 38C (100.4F), Mild Pain (1 - 3), Moderate Pain (4 - 6)      
Patient seen and examined at bedside, no acute overnight events. No acute complaints. Patient is ambulating and tolerating regular diet. Denies CP, SOB, N/V, fevers, chills, or any other concerns.    Vital Signs Last 24 Hours  T(C): 36.7 (07-17-23 @ 05:12), Max: 37.1 (07-16-23 @ 08:56)  HR: 61 (07-17-23 @ 05:12) (61 - 76)  BP: 109/56 (07-17-23 @ 05:12) (97/53 - 127/59)  RR: 16 (07-17-23 @ 05:12) (16 - 18)  SpO2: 100% (07-17-23 @ 05:12) (98% - 100%)    I&O's Summary      Physical Exam:  General: NAD  CV: RR  Lungs: breathing comfortably on RA  Abdomen: soft, gravid, non-tender  Ext: no pain or swelling    Labs:             9.4<L>  8.68  )-----------( 244      ( 07-17 @ 05:16 )             30.2<L>               9.2<L>  11.49<H> )-----------( 232      ( 07-16 @ 11:08 )             28.9<L>               10.2<L>  15.19<H> )-----------( 244      ( 07-16 @ 02:45 )             32.1<L>        MEDICATIONS  (STANDING):  cefTRIAXone   IVPB 1000 milliGRAM(s) IV Intermittent every 24 hours  heparin   Injectable 5000 Unit(s) SubCutaneous every 12 hours    MEDICATIONS  (PRN):  acetaminophen     Tablet .. 975 milliGRAM(s) Oral every 6 hours PRN Temp greater or equal to 38C (100.4F), Mild Pain (1 - 3), Moderate Pain (4 - 6)      
MFM Progress Note     26yo  at 22w2d (by bedside sono and radiology sono today), who came to [facility] ED due to pain with urination and back pain. Patient reports subjective fevers/chills at home. She states that she came to the ED because she felt like when she urinated she was not able to completely void and it was uncomfortable. Patient denies abdominal pain. She states that she did not know she was pregnant until today and she is very surprised. She does desire to keep the pregnancy.     OB Hx: 2 - 2017 delivered @ 37w for ICP and 2020 FT Female uncomplicated   PMH: Gallstones managed with diet  PSH: Denies   SH: Patient denies tobacco use, endorses use of marijuana and socially drinks alcohol - she reports drinking on two different events in the last 4 months and she is concerned this could have impacted the fetus     ICU Vital Signs Last 24 Hrs  T(C): 36.7 (2023 12:03), Max: 37.2 (15 Jul 2023 23:53)  T(F): 98.1 (2023 12:03), Max: 99 (15 Jul 2023 23:53)  HR: 63 (2023 12:03) (63 - 90)  BP: 118/60 (2023 12:03) (98/52 - 127/74)  BP(mean): --  ABP: --  ABP(mean): --  RR: 16 (2023 12:03) (16 - 18)  SpO2: 98% (2023 08:00) (97% - 100%)    Gen: NAD  Pulm: non labored breathing   CV: RRR   Abd: soft, nontender   Back: +CVA tenderness on R side                WBC at [facility]: 15    Color: Yellow / Appearance: Clear / S.010 / pH: x  Gluc: 85 mg/dL / Ketone: Trace  / Bili: Negative / Urobili: Negative mg/dL   Blood: x / Protein: 30 mg/dL / Nitrite: Negative   Leuk Esterase: Moderate / RBC: 3-5 /HPF / WBC 26-50   Sq Epi: x / Non Sq Epi: x / Bacteria: Few    
Patient seen and examined at bedside, no acute overnight events. No acute complaints. Patient endorses good fetal movement. Patient is ambulating and tolerating regular diet. Denies CP, SOB, N/V, fevers, chills, or any other concerns.    Vital Signs Last 24 Hours  T(C): 36.7 (07-18-23 @ 05:20), Max: 37 (07-17-23 @ 17:06)  HR: 59 (07-18-23 @ 05:20) (59 - 76)  BP: 102/56 (07-18-23 @ 05:20) (102/56 - 120/59)  RR: 17 (07-18-23 @ 05:20) (16 - 18)  SpO2: 100% (07-18-23 @ 05:20) (99% - 100%)    I&O's Summary    17 Jul 2023 07:01  -  18 Jul 2023 07:00  --------------------------------------------------------  IN: 0 mL / OUT: 1750 mL / NET: -1750 mL    Culture Results:   >100,000 CFU/ml Staphylococcus saprophyticus   ***********Note************ Routine susceptibility testing of these urine   isolates is not advised. Due to the high antibiotic concentration   achieved in urine,   uncomplicated urinary tract infections can be treated with commonly used   antibiotics (e.g. Nitrofurantoin, Trimethoprim/Sulfamethoxazole or   Fluoroquinolones). (07.16.23 @ 02:45)     Physical Exam:  General: NAD  CV: RR  Lungs: breathing comfortably on RA  Back: No CVA tenderness  Abdomen: soft, gravid, non-tender  Ext: no pain or swelling    Labs:             9.4<L>  8.68  )-----------( 244      ( 07-17 @ 05:16 )             30.2<L>               9.2<L>  11.49<H> )-----------( 232      ( 07-16 @ 11:08 )             28.9<L>               10.2<L>  15.19<H> )-----------( 244      ( 07-16 @ 02:45 )             32.1<L>        MEDICATIONS  (STANDING):  cefTRIAXone   IVPB 1000 milliGRAM(s) IV Intermittent every 24 hours  ferrous    sulfate 325 milliGRAM(s) Oral daily  heparin   Injectable 5000 Unit(s) SubCutaneous every 12 hours  prenatal multivitamin 1 Tablet(s) Oral daily    MEDICATIONS  (PRN):  acetaminophen     Tablet .. 975 milliGRAM(s) Oral every 6 hours PRN Temp greater or equal to 38C (100.4F), Mild Pain (1 - 3), Moderate Pain (4 - 6)

## 2024-06-09 NOTE — OB RN PATIENT PROFILE - LIVING CHILDREN, OB PROFILE
SUBJECTIVE:    Patient ID: Becky Sy is a 49 y.o. female.    Chief Complaint: Follow-up (No bottles, no complaints, mammogram ordered, abc )    Pt seen to checkup on acute and chronic conditions.      Sprained her ankle in April and seeing Ortho (Kenn[facility]), and is now wearing ankle brace. Also now taking gabapentin which also has a calming affect. Taking it twice a day.         Anxiety has been a little more clear since being on gabapentin.  Last panic attack was 2 nights ago but it was situational and was able to calm down pretty quick without taking anything. Used her deep breathing exercises.Still taking pristiq and xanax three times daily. (Failed effexor, luvox).     Has lost 19 pounds since last visit.     Still smoking, about a ppd. Doesn't want to anymore.     Has only had 1 bad headache this month. Took ubrelvy as needed. Takes tylenol/ibuprofen if needed.     No recent labs.    Has chronic back pain. It is ok right now. Pain comes and goes depending on how she twists. Doing better now that she is not in walking boot.  Has put herself on lighter duty so she doesn't hurt herself.  Working at [employer]. Taking norco as needed for pain.     ------------------------------------------------------  Mammogram UTD 8/2023.     Cscope 7/2022 ([facility]), 3 polyps, to repeat in 3 yrs.      Results for orders placed or performed in visit on 12/11/23   DRUG MONITOR, PANEL 4, W/CONF, URINE   Result Value Ref Range    Amphetamines NEGATIVE <500 ng/mL    Barbiturates NEGATIVE <300 ng/mL    Benzodiazepines POSITIVE (A) <100 ng/mL    Alphahydroxyalprazolam 65 (H) <25 ng/mL    Alphahydroxymidazolam NEGATIVE <50 ng/mL    Alphahydroxytriazolam NEGATIVE <50 ng/mL    Aminoclonazepam NEGATIVE <25 ng/mL    hydroxyethylflurazepam UR GC/MS NEGATIVE <50 ng/mL    Lorazepam NEGATIVE <50 ng/mL    Nordiazepam Lvl NEGATIVE <50 ng/mL    Oxazepam NEGATIVE <50 ng/mL    Temazepam GC/MS Conf NEGATIVE <50 ng/mL    Benzodiazepines  Comments      Cocaine Metabolites NEGATIVE <150 ng/mL    Methadone NEGATIVE <100 ng/mL    Opiates NEGATIVE <100 ng/mL    Oxycodone NEGATIVE <100 ng/mL    Phencyclidine NEGATIVE <25 ng/mL    Creatinine 27.4 > or = 20.0 mg/dL    pH 7.3 4.5 - 9.0    Oxidants, Urine (Tox) NEGATIVE <200 mcg/mL    Notes and Comments     POCT Influenza A/B Molecular   Result Value Ref Range    POC Molecular Influenza A Ag Negative Negative, Not Reported    POC Molecular Influenza B Ag Positive (A) Negative, Not Reported     Acceptable Yes              Past Medical History:   Diagnosis Date    Allergy     Anxiety     Anxiety 1994    on meds    Arthritis 1990    knees miley, miley thumbs    Depression     Fibroids 09/03/2019    Dr Turpin    Menorrhagia 09/03/2019    Dr Turpin    V-tach 2007    resolved itself     Social History     Socioeconomic History    Marital status:    Tobacco Use    Smoking status: Every Day     Current packs/day: 0.50     Average packs/day: 0.5 packs/day for 20.0 years (10.0 ttl pk-yrs)     Types: Cigarettes    Smokeless tobacco: Never    Tobacco comments:     Still smoking   Substance and Sexual Activity    Alcohol use: Yes     Comment: Occasionally    Drug use: No    Sexual activity: Yes     Partners: Male     Social Determinants of Health     Financial Resource Strain: High Risk (11/11/2020)    Overall Financial Resource Strain (CARDIA)     Difficulty of Paying Living Expenses: Hard   Food Insecurity: Food Insecurity Present (11/11/2020)    Hunger Vital Sign     Worried About Running Out of Food in the Last Year: Often true     Ran Out of Food in the Last Year: Sometimes true   Transportation Needs: No Transportation Needs (11/11/2020)    PRAPARE - Transportation     Lack of Transportation (Medical): No     Lack of Transportation (Non-Medical): No   Physical Activity: Insufficiently Active (11/11/2020)    Exercise Vital Sign     Days of Exercise per Week: 3 days     Minutes of Exercise per Session:  20 min   Stress: Stress Concern Present (11/13/2019)    [place] [employer] - Occupational Stress Questionnaire     Feeling of Stress : To some extent     Past Surgical History:   Procedure Laterality Date    ARTHROSCOPIC CHONDROPLASTY OF KNEE JOINT Left 1997    BACK SURGERY  2007    jerrell    BREAST BIOPSY Left 1996    ECHOCARDIOGRAPHY  2007    HYSTERECTOMY      laparascopic  1998    diagnostic to remove endometriosis    LAPAROSCOPY-ASSISTED VAGINAL HYSTERECTOMY N/A 9/17/2019    Procedure: HYSTERECTOMY, VAGINAL, LAPAROSCOPY-ASSISTED;  Surgeon: Isabella Turpin MD;  Location: [facility];  Service: OB/GYN;  Laterality: N/A;    stess test  2007     Family History   Problem Relation Name Age of Onset    Hypertension Mother         Review of patient's allergies indicates:  No Known Allergies    Current Outpatient Medications:     albuterol (PROVENTIL/VENTOLIN HFA) 90 mcg/actuation inhaler, , Disp: , Rfl:     ALPRAZolam (XANAX) 0.5 MG tablet, Take 1 tablet (0.5 mg total) by mouth 3 (three) times daily., Disp: 270 tablet, Rfl: 0    azelastine (ASTELIN) 137 mcg (0.1 %) nasal spray, SPRAY 1 SPRAY IN EACH NOSTRIL 2 TIMES PER DAY, Disp: , Rfl:     cetirizine (ZYRTEC) 10 MG tablet, , Disp: , Rfl:     desvenlafaxine succinate (PRISTIQ) 50 MG Tb24, Take 1 tablet (50 mg total) by mouth once daily., Disp: 30 tablet, Rfl: 11    diclofenac sodium (VOLTAREN) 1 % Gel, Apply 2 g topically 4 (four) times daily., Disp: 100 g, Rfl: 3    fluticasone propionate (FLONASE) 50 mcg/actuation nasal spray, 1 spray (50 mcg total) by Each Nostril route once daily., Disp: 18.2 mL, Rfl: 3    methocarbamoL (ROBAXIN) 750 MG Tab, Take 1 tablet (750 mg total) by mouth 2 (two) times daily as needed (spasm)., Disp: 60 tablet, Rfl: 5    multivitamin capsule, Take 1 capsule by mouth once daily., Disp: , Rfl:     naproxen (NAPROSYN) 500 MG tablet, Take 1 tablet by mouth 2 (two) times daily., Disp: , Rfl:     pregabalin (LYRICA) 50 MG capsule,  "Take 1 capsule (50 mg total) by mouth 3 (three) times daily., Disp: 90 capsule, Rfl: 2    UBRELVY 50 mg tablet, Take 1 tablet (50 mg total) by mouth daily as needed for Migraine., Disp: 10 tablet, Rfl: 2    HYDROcodone-acetaminophen (NORCO) 5-325 mg per tablet, Take 1 tablet by mouth every 8 (eight) hours as needed for Pain., Disp: 21 tablet, Rfl: 0    Review of Systems   Constitutional:  Negative for appetite change, fatigue, fever and unexpected weight change.   Respiratory:  Negative for cough, chest tightness, shortness of breath and wheezing.    Cardiovascular:  Negative for chest pain and leg swelling.   Gastrointestinal:  Negative for abdominal pain, constipation, nausea and vomiting.        -heartburn   Genitourinary:  Negative for difficulty urinating, dysuria, frequency and urgency.        +pain with sex, due to bladder issue   Musculoskeletal:  Negative for arthralgias, back pain, myalgias and neck pain.   Skin:  Negative for rash.   Neurological:  Negative for dizziness, weakness, numbness and headaches.   Hematological:  Does not bruise/bleed easily.   Psychiatric/Behavioral:  Negative for dysphoric mood, sleep disturbance and suicidal ideas. The patient is not nervous/anxious.    All other systems reviewed and are negative.         Objective:      Vitals:    06/14/24 0742   BP: 90/60   Pulse: 88   SpO2: 98%   Weight: 78.9 kg (174 lb)   Height: 5' 6" (1.676 m)         Wt Readings from Last 3 Encounters:   06/14/24 78.9 kg (174 lb)   05/28/24 87.5 kg (193 lb)   05/09/24 81.2 kg (179 lb)       Physical Exam  Vitals reviewed.   Constitutional:       General: She is not in acute distress.     Appearance: Normal appearance. She is well-developed.   HENT:      Head: Normocephalic and atraumatic.   Neck:      Thyroid: No thyromegaly.   Cardiovascular:      Rate and Rhythm: Normal rate and regular rhythm.      Heart sounds: Normal heart sounds. No murmur heard.     No friction rub.   Pulmonary:      Effort: " 2 Pulmonary effort is normal.      Breath sounds: Normal breath sounds. No wheezing or rales.   Abdominal:      General: Bowel sounds are normal. There is no distension.      Palpations: Abdomen is soft.      Tenderness: There is no abdominal tenderness.   Musculoskeletal:      Cervical back: Neck supple.   Lymphadenopathy:      Cervical: No cervical adenopathy.   Skin:     General: Skin is warm and dry.      Findings: No rash.   Neurological:      Mental Status: She is alert and oriented to person, place, and time.   Psychiatric:         Attention and Perception: She is attentive.         Speech: Speech normal.         Behavior: Behavior normal.         Thought Content: Thought content normal.         Judgment: Judgment normal.           Assessment:       1. Anxiety    2. Allergic rhinitis, unspecified seasonality, unspecified trigger    3. Chronic right-sided low back pain without sciatica    4. Chronic migraine without aura without status migrainosus, not intractable    5. Screening mammogram for high-risk patient    6. Long-term use of high-risk medication             Plan:       Anxiety  Comments:  Controlled. Will continue to monitor symptoms on pristiq, xanax. Seems gabapentin is helping with anxiety as well, may continue on regimen.    Allergic rhinitis, unspecified seasonality, unspecified trigger  Comments:  Chronic. Encouraged to continue flonase and antihistamine.    Chronic right-sided low back pain without sciatica  Comments:  Chronic. Symptomatic To continue hydrocodone prn. To follow with PM  Orders:  -     HYDROcodone-acetaminophen (NORCO) 5-325 mg per tablet; Take 1 tablet by mouth every 8 (eight) hours as needed for Pain.  Dispense: 21 tablet; Refill: 0    Chronic migraine without aura without status migrainosus, not intractable  Comments:  Stable. Will continue to monitor frequency and severity of headaches.    Screening mammogram for high-risk patient  -     Mammo Digital Screening Bilat w/ Edwar;  Future; Expected date: 06/14/2024    Long-term use of high-risk medication  -     TSH w/reflex to FT4; Future; Expected date: 06/14/2024  -     Urinalysis, Reflex to Urine Culture Urine, Clean Catch; Future; Expected date: 06/14/2024  -     CBC Auto Differential; Future; Expected date: 06/14/2024  -     Lipid Panel; Future; Expected date: 06/14/2024  -     Comprehensive Metabolic Panel; Future; Expected date: 06/14/2024      Labs and/or tests have been ordered for the evaluation/monitoring of acute/chronic conditions, to be done just before next visit.    Follow up in about 4 months (around 10/14/2024) for Anxiety, Allergies, Chronic Pain, Migraines.        6/14/2024 Pili Aguirre

## 2025-01-29 NOTE — OB RN PATIENT PROFILE - GRAVIDA, OB PROFILE
Left message for mom to return call to follow up and see if she has been able to get the patient's insurance issues figured out and to schedule a lab appointment prior to seeing Dr. Sow on 2/11/25.    If mom returns call please either schedule lab appointment if she is able or patient can speak with RN triage/ RN triage.     5

## 2025-05-05 ENCOUNTER — TRANSCRIPTION ENCOUNTER (OUTPATIENT)
Age: 30
End: 2025-05-05

## 2025-05-05 ENCOUNTER — INPATIENT (INPATIENT)
Facility: HOSPITAL | Age: 30
LOS: 1 days | Discharge: HOME | End: 2025-05-07
Attending: SURGERY | Admitting: SURGERY
Payer: MEDICAID

## 2025-05-05 VITALS
DIASTOLIC BLOOD PRESSURE: 80 MMHG | TEMPERATURE: 98 F | HEART RATE: 88 BPM | WEIGHT: 149.91 LBS | HEIGHT: 62 IN | RESPIRATION RATE: 19 BRPM | SYSTOLIC BLOOD PRESSURE: 123 MMHG

## 2025-05-05 PROBLEM — K80.20 CALCULUS OF GALLBLADDER WITHOUT CHOLECYSTITIS WITHOUT OBSTRUCTION: Chronic | Status: ACTIVE | Noted: 2023-07-17

## 2025-05-05 LAB
ALBUMIN SERPL ELPH-MCNC: 3.2 G/DL — LOW (ref 3.3–5)
ALP SERPL-CCNC: 69 U/L — SIGNIFICANT CHANGE UP (ref 40–120)
ALT FLD-CCNC: 21 U/L — SIGNIFICANT CHANGE UP (ref 12–78)
ANION GAP SERPL CALC-SCNC: 1 MMOL/L — LOW (ref 5–17)
AST SERPL-CCNC: 22 U/L — SIGNIFICANT CHANGE UP (ref 15–37)
BASOPHILS # BLD AUTO: 0.05 K/UL — SIGNIFICANT CHANGE UP (ref 0–0.2)
BASOPHILS NFR BLD AUTO: 0.5 % — SIGNIFICANT CHANGE UP (ref 0–2)
BILIRUB SERPL-MCNC: 0.2 MG/DL — SIGNIFICANT CHANGE UP (ref 0.2–1.2)
BUN SERPL-MCNC: 15 MG/DL — SIGNIFICANT CHANGE UP (ref 7–23)
CALCIUM SERPL-MCNC: 8.8 MG/DL — SIGNIFICANT CHANGE UP (ref 8.5–10.1)
CHLORIDE SERPL-SCNC: 107 MMOL/L — SIGNIFICANT CHANGE UP (ref 96–108)
CO2 SERPL-SCNC: 29 MMOL/L — SIGNIFICANT CHANGE UP (ref 22–31)
CREAT SERPL-MCNC: 0.8 MG/DL — SIGNIFICANT CHANGE UP (ref 0.5–1.3)
EGFR: 104 ML/MIN/1.73M2 — SIGNIFICANT CHANGE UP
EGFR: 104 ML/MIN/1.73M2 — SIGNIFICANT CHANGE UP
EOSINOPHIL # BLD AUTO: 0.18 K/UL — SIGNIFICANT CHANGE UP (ref 0–0.5)
EOSINOPHIL NFR BLD AUTO: 1.9 % — SIGNIFICANT CHANGE UP (ref 0–6)
GLUCOSE SERPL-MCNC: 104 MG/DL — HIGH (ref 70–99)
HCG SERPL-ACNC: <1 MIU/ML — SIGNIFICANT CHANGE UP
HCT VFR BLD CALC: 38.9 % — SIGNIFICANT CHANGE UP (ref 34.5–45)
HGB BLD-MCNC: 12.4 G/DL — SIGNIFICANT CHANGE UP (ref 11.5–15.5)
IMM GRANULOCYTES NFR BLD AUTO: 0.2 % — SIGNIFICANT CHANGE UP (ref 0–0.9)
LIDOCAIN IGE QN: 60 U/L — SIGNIFICANT CHANGE UP (ref 13–75)
LYMPHOCYTES # BLD AUTO: 4.48 K/UL — HIGH (ref 1–3.3)
LYMPHOCYTES # BLD AUTO: 46.7 % — HIGH (ref 13–44)
MCHC RBC-ENTMCNC: 27.3 PG — SIGNIFICANT CHANGE UP (ref 27–34)
MCHC RBC-ENTMCNC: 31.9 G/DL — LOW (ref 32–36)
MCV RBC AUTO: 85.7 FL — SIGNIFICANT CHANGE UP (ref 80–100)
MONOCYTES # BLD AUTO: 0.56 K/UL — SIGNIFICANT CHANGE UP (ref 0–0.9)
MONOCYTES NFR BLD AUTO: 5.8 % — SIGNIFICANT CHANGE UP (ref 2–14)
NEUTROPHILS # BLD AUTO: 4.31 K/UL — SIGNIFICANT CHANGE UP (ref 1.8–7.4)
NEUTROPHILS NFR BLD AUTO: 44.9 % — SIGNIFICANT CHANGE UP (ref 43–77)
NRBC BLD AUTO-RTO: 0 /100 WBCS — SIGNIFICANT CHANGE UP (ref 0–0)
PLATELET # BLD AUTO: 190 K/UL — SIGNIFICANT CHANGE UP (ref 150–400)
POTASSIUM SERPL-MCNC: 4.4 MMOL/L — SIGNIFICANT CHANGE UP (ref 3.5–5.3)
POTASSIUM SERPL-SCNC: 4.4 MMOL/L — SIGNIFICANT CHANGE UP (ref 3.5–5.3)
PROT SERPL-MCNC: 7.2 GM/DL — SIGNIFICANT CHANGE UP (ref 6–8.3)
RBC # BLD: 4.54 M/UL — SIGNIFICANT CHANGE UP (ref 3.8–5.2)
RBC # FLD: 14.1 % — SIGNIFICANT CHANGE UP (ref 10.3–14.5)
SODIUM SERPL-SCNC: 137 MMOL/L — SIGNIFICANT CHANGE UP (ref 135–145)
WBC # BLD: 9.6 K/UL — SIGNIFICANT CHANGE UP (ref 3.8–10.5)
WBC # FLD AUTO: 9.6 K/UL — SIGNIFICANT CHANGE UP (ref 3.8–10.5)

## 2025-05-05 PROCEDURE — 99285 EMERGENCY DEPT VISIT HI MDM: CPT

## 2025-05-05 PROCEDURE — 76705 ECHO EXAM OF ABDOMEN: CPT | Mod: 26

## 2025-05-05 PROCEDURE — 93010 ELECTROCARDIOGRAM REPORT: CPT

## 2025-05-05 RX ORDER — ACETAMINOPHEN 500 MG/5ML
1000 LIQUID (ML) ORAL ONCE
Refills: 0 | Status: COMPLETED | OUTPATIENT
Start: 2025-05-05 | End: 2025-05-05

## 2025-05-05 RX ORDER — ACETAMINOPHEN 500 MG/5ML
1000 LIQUID (ML) ORAL ONCE
Refills: 0 | Status: COMPLETED | OUTPATIENT
Start: 2025-05-06 | End: 2025-05-06

## 2025-05-05 RX ORDER — HYDROMORPHONE/SOD CHLOR,ISO/PF 2 MG/10 ML
1 SYRINGE (ML) INJECTION EVERY 6 HOURS
Refills: 0 | Status: DISCONTINUED | OUTPATIENT
Start: 2025-05-05 | End: 2025-05-06

## 2025-05-05 RX ORDER — CEFTRIAXONE 500 MG/1
1000 INJECTION, POWDER, FOR SOLUTION INTRAMUSCULAR; INTRAVENOUS EVERY 24 HOURS
Refills: 0 | Status: DISCONTINUED | OUTPATIENT
Start: 2025-05-06 | End: 2025-05-06

## 2025-05-05 RX ORDER — METRONIDAZOLE 250 MG
500 TABLET ORAL EVERY 12 HOURS
Refills: 0 | Status: DISCONTINUED | OUTPATIENT
Start: 2025-05-05 | End: 2025-05-06

## 2025-05-05 RX ORDER — SODIUM CHLORIDE 9 G/1000ML
1000 INJECTION, SOLUTION INTRAVENOUS
Refills: 0 | Status: DISCONTINUED | OUTPATIENT
Start: 2025-05-05 | End: 2025-05-06

## 2025-05-05 RX ORDER — SUCRALFATE 1 G
1 TABLET ORAL ONCE
Refills: 0 | Status: COMPLETED | OUTPATIENT
Start: 2025-05-05 | End: 2025-05-05

## 2025-05-05 RX ORDER — MAGNESIUM, ALUMINUM HYDROXIDE 200-200 MG
30 TABLET,CHEWABLE ORAL ONCE
Refills: 0 | Status: COMPLETED | OUTPATIENT
Start: 2025-05-05 | End: 2025-05-05

## 2025-05-05 RX ORDER — HEPARIN SODIUM 1000 [USP'U]/ML
5000 INJECTION INTRAVENOUS; SUBCUTANEOUS EVERY 8 HOURS
Refills: 0 | Status: DISCONTINUED | OUTPATIENT
Start: 2025-05-05 | End: 2025-05-07

## 2025-05-05 RX ORDER — HYDROMORPHONE/SOD CHLOR,ISO/PF 2 MG/10 ML
0.5 SYRINGE (ML) INJECTION EVERY 6 HOURS
Refills: 0 | Status: DISCONTINUED | OUTPATIENT
Start: 2025-05-05 | End: 2025-05-06

## 2025-05-05 RX ORDER — ONDANSETRON HCL/PF 4 MG/2 ML
4 VIAL (ML) INJECTION EVERY 6 HOURS
Refills: 0 | Status: DISCONTINUED | OUTPATIENT
Start: 2025-05-05 | End: 2025-05-07

## 2025-05-05 RX ORDER — CEFTRIAXONE 500 MG/1
1000 INJECTION, POWDER, FOR SOLUTION INTRAMUSCULAR; INTRAVENOUS ONCE
Refills: 0 | Status: COMPLETED | OUTPATIENT
Start: 2025-05-05 | End: 2025-05-05

## 2025-05-05 RX ADMIN — Medication 1000 MILLIGRAM(S): at 11:20

## 2025-05-05 RX ADMIN — Medication 1000 MILLIGRAM(S): at 22:45

## 2025-05-05 RX ADMIN — Medication 100 MILLIGRAM(S): at 18:40

## 2025-05-05 RX ADMIN — Medication 400 MILLIGRAM(S): at 21:45

## 2025-05-05 RX ADMIN — SODIUM CHLORIDE 120 MILLILITER(S): 9 INJECTION, SOLUTION INTRAVENOUS at 08:17

## 2025-05-05 RX ADMIN — HEPARIN SODIUM 5000 UNIT(S): 1000 INJECTION INTRAVENOUS; SUBCUTANEOUS at 21:45

## 2025-05-05 RX ADMIN — Medication 4 MILLIGRAM(S): at 19:45

## 2025-05-05 RX ADMIN — Medication 20 MILLIGRAM(S): at 02:46

## 2025-05-05 RX ADMIN — Medication 1 GRAM(S): at 05:06

## 2025-05-05 RX ADMIN — Medication 30 MILLILITER(S): at 02:46

## 2025-05-05 RX ADMIN — Medication 400 MILLIGRAM(S): at 10:20

## 2025-05-05 RX ADMIN — SODIUM CHLORIDE 120 MILLILITER(S): 9 INJECTION, SOLUTION INTRAVENOUS at 21:46

## 2025-05-05 RX ADMIN — Medication 4 MILLIGRAM(S): at 05:06

## 2025-05-05 RX ADMIN — Medication 400 MILLIGRAM(S): at 18:39

## 2025-05-05 RX ADMIN — CEFTRIAXONE 100 MILLIGRAM(S): 500 INJECTION, POWDER, FOR SOLUTION INTRAMUSCULAR; INTRAVENOUS at 08:15

## 2025-05-05 RX ADMIN — HEPARIN SODIUM 5000 UNIT(S): 1000 INJECTION INTRAVENOUS; SUBCUTANEOUS at 14:21

## 2025-05-05 NOTE — ED ADULT NURSE NOTE - OBJECTIVE STATEMENT
Pt presents to ED c/o abdominal pain felt "in the middle" and nausea but no vomiting for several weeks.

## 2025-05-05 NOTE — H&P ADULT - NSHPLABSRESULTS_GEN_ALL_CORE
< from: US Abdomen Upper Quadrant Right (05.05.25 @ 04:54) >      FINDINGS:  Liver: Within normal limits.  Bile ducts: Normal caliber. Common bile duct measures 3 mm.  Gallbladder: Wall echo shadow sign compatible with a stone filled   gallbladder. Wall thickening of 5 mm. Sonographer reports a positive   sonographic Rodriguez's sign.  Pancreas: Visualized portions are within normal limits.  Right kidney: 10.7 cm. No hydronephrosis.  Ascites: None.  IVC: Visualized portions are within normal limits.    IMPRESSION:  Stone filled gallbladder with wall thickening and positive sonographic   Rodriguez's sign concerning for acute cholecystitis. This can be confirmed   with HIDA scan.    < end of copied text >

## 2025-05-05 NOTE — ED PROVIDER NOTE - NS ED MD DISPO DIVISION
Quality 226: Preventive Care And Screening: Tobacco Use: Screening And Cessation Intervention: Patient screened for tobacco use and is an ex/non-smoker Detail Level: Detailed [facility] Quality 110: Preventive Care And Screening: Influenza Immunization: Influenza Immunization Administered during Influenza season

## 2025-05-05 NOTE — ED PROVIDER NOTE - OBJECTIVE STATEMENT
29 F pmh gallstones presenting to the ED for worsening abdominal pain. symptoms present over the past few weeks but today has remained more constant. pt states that pain is tight and heavy at the same time. + nausea, no vomiting

## 2025-05-05 NOTE — ED PROVIDER NOTE - CLINICAL SUMMARY MEDICAL DECISION MAKING FREE TEXT BOX
29 F pmh cholelithiasis presenting to the ED for abdominal pain    epigastric/RUQ ttp  concern for gallstones, dyspepsia  labs, RUQ US  pain control  imaging concerning for acute cholcystitis  surgery consult 29 F pmh cholelithiasis presenting to the ED for abdominal pain    epigastric/RUQ ttp  concern for gallstones, dyspepsia  labs, RUQ US  pain control  imaging concerning for acute cholecystitis  surgery consult  surgery admit

## 2025-05-05 NOTE — H&P ADULT - NSHPPHYSICALEXAM_GEN_ALL_CORE
Gen: NAD, non toxic  Eyes: anicteric  Resp: breathing normally  CV: RRR  Abd: soft not distended, tender at RUQ  MSK: no deformity  Skin: warm, dry  Neuro: AO3  Psych: appropriate

## 2025-05-05 NOTE — H&P ADULT - ASSESSMENT
29F here with acute cholecystitis  Admit to general surgery service  make NPO, start IV abx, IVF  pain control as needed  VTE ppx, sqh, scd  plan for laparoscopic cholecystectomy

## 2025-05-05 NOTE — ED PROVIDER NOTE - CONSIDERATION OF ADMISSION OBSERVATION
Consideration of Admission/Observation considering admission for surgical intervention and management

## 2025-05-05 NOTE — ED ADULT NURSE NOTE - NSFALLLASTSIX_ED_ALL_ED
Writer met with pt in office with Dr Arellano. Pt is here for another cycle of therapy.     Overall pt continues to tolerate very well. Pt does note fatigue and has transitioned to complete hair loss, which she shares of that experience.   Pt is coping effectively.     Labs reviewed. Adequate for treatment. Coping effectively. Denies additional needs at this time    RTC as scheduled  
No.

## 2025-05-05 NOTE — PATIENT PROFILE ADULT - FALL HARM RISK - UNIVERSAL INTERVENTIONS
Bed in lowest position, wheels locked, appropriate side rails in place/Call bell, personal items and telephone in reach/Instruct patient to call for assistance before getting out of bed or chair/Non-slip footwear when patient is out of bed/[place] to call system/Physically safe environment - no spills, clutter or unnecessary equipment/Purposeful Proactive Rounding/Room/bathroom lighting operational, light cord in reach

## 2025-05-05 NOTE — ED PROVIDER NOTE - PHYSICAL EXAMINATION
General: Well appearing female in no acute distress  HEENT: Normocephalic, atraumatic. Moist mucous membranes. Oropharynx clear. No lymphadenopathy.  Eyes: No scleral icterus. EOMI. TSERING.  Neck:. Soft and supple. Full ROM without pain. No midline tenderness  Cardiac: Regular rate and regular rhythm. No murmurs, rubs, gallops. Peripheral pulses 2+ and symmetric. No LE edema.  Resp: Lungs CTAB. Speaking in full sentences. No wheezes, rales or rhonchi.  Abd: Soft, epigastric and RUQ ttp. No guarding or rebound  Back: Spine midline and non-tender. No CVA tenderness.    Skin: No rashes, abrasions, or lacerations.  Neuro: AO x 3. Moves all extremities symmetrically. Motor strength and sensation grossly intact. ambulatory w/ steady gait Flu vaccine

## 2025-05-05 NOTE — H&P ADULT - HISTORY OF PRESENT ILLNESS
29F no significant pmh, no previous surgeries, c/o RUQ abdominal pain for last 3 weeks, says she has chronic pain in RUQ, knows she has cholelithiasis, has been hesitant to have surgery, last 3 days pain was so bad she came to hospital.  Denies fevers, chills, nausea or vomiting

## 2025-05-05 NOTE — ED PROVIDER NOTE - NS ED ROS FT
General: Denies fever, chills  HEENT: Denies sensory changes, sore throat  Neck: Denies neck pain, neck stiffness  Resp: Denies coughing, SOB  Cardiovascular: Denies CP, palpitations, LE edema  GI:+ abd pain  : Denies dysuria, hematuria, frequency, incontinence  MSK: Denies back pain  Neuro: Denies HA, dizziness, numbness, weakness  Skin: Denies rashes.

## 2025-05-06 LAB
ALBUMIN SERPL ELPH-MCNC: 3.2 G/DL — LOW (ref 3.3–5)
ALP SERPL-CCNC: 56 U/L — SIGNIFICANT CHANGE UP (ref 40–120)
ALT FLD-CCNC: 27 U/L — SIGNIFICANT CHANGE UP (ref 12–78)
ANION GAP SERPL CALC-SCNC: 5 MMOL/L — SIGNIFICANT CHANGE UP (ref 5–17)
APTT BLD: 28.7 SEC — SIGNIFICANT CHANGE UP (ref 26.1–36.8)
AST SERPL-CCNC: 22 U/L — SIGNIFICANT CHANGE UP (ref 15–37)
BILIRUB SERPL-MCNC: 0.5 MG/DL — SIGNIFICANT CHANGE UP (ref 0.2–1.2)
BLD GP AB SCN SERPL QL: SIGNIFICANT CHANGE UP
BUN SERPL-MCNC: 9 MG/DL — SIGNIFICANT CHANGE UP (ref 7–23)
CALCIUM SERPL-MCNC: 8.7 MG/DL — SIGNIFICANT CHANGE UP (ref 8.5–10.1)
CHLORIDE SERPL-SCNC: 108 MMOL/L — SIGNIFICANT CHANGE UP (ref 96–108)
CO2 SERPL-SCNC: 26 MMOL/L — SIGNIFICANT CHANGE UP (ref 22–31)
CREAT SERPL-MCNC: 0.71 MG/DL — SIGNIFICANT CHANGE UP (ref 0.5–1.3)
EGFR: 119 ML/MIN/1.73M2 — SIGNIFICANT CHANGE UP
EGFR: 119 ML/MIN/1.73M2 — SIGNIFICANT CHANGE UP
GLUCOSE SERPL-MCNC: 78 MG/DL — SIGNIFICANT CHANGE UP (ref 70–99)
HCG SERPL-ACNC: <1 MIU/ML — SIGNIFICANT CHANGE UP
HCT VFR BLD CALC: 38.9 % — SIGNIFICANT CHANGE UP (ref 34.5–45)
HGB BLD-MCNC: 12.9 G/DL — SIGNIFICANT CHANGE UP (ref 11.5–15.5)
INR BLD: 1.1 RATIO — SIGNIFICANT CHANGE UP (ref 0.85–1.16)
MAGNESIUM SERPL-MCNC: 1.9 MG/DL — SIGNIFICANT CHANGE UP (ref 1.6–2.6)
MCHC RBC-ENTMCNC: 27.5 PG — SIGNIFICANT CHANGE UP (ref 27–34)
MCHC RBC-ENTMCNC: 33.2 G/DL — SIGNIFICANT CHANGE UP (ref 32–36)
MCV RBC AUTO: 82.9 FL — SIGNIFICANT CHANGE UP (ref 80–100)
NRBC BLD AUTO-RTO: 0 /100 WBCS — SIGNIFICANT CHANGE UP (ref 0–0)
PHOSPHATE SERPL-MCNC: 3.4 MG/DL — SIGNIFICANT CHANGE UP (ref 2.5–4.5)
PLATELET # BLD AUTO: 174 K/UL — SIGNIFICANT CHANGE UP (ref 150–400)
POTASSIUM SERPL-MCNC: 4 MMOL/L — SIGNIFICANT CHANGE UP (ref 3.5–5.3)
POTASSIUM SERPL-SCNC: 4 MMOL/L — SIGNIFICANT CHANGE UP (ref 3.5–5.3)
PROT SERPL-MCNC: 6.5 GM/DL — SIGNIFICANT CHANGE UP (ref 6–8.3)
PROTHROM AB SERPL-ACNC: 12.4 SEC — SIGNIFICANT CHANGE UP (ref 9.9–13.4)
RBC # BLD: 4.69 M/UL — SIGNIFICANT CHANGE UP (ref 3.8–5.2)
RBC # FLD: 13.7 % — SIGNIFICANT CHANGE UP (ref 10.3–14.5)
SODIUM SERPL-SCNC: 139 MMOL/L — SIGNIFICANT CHANGE UP (ref 135–145)
WBC # BLD: 6.69 K/UL — SIGNIFICANT CHANGE UP (ref 3.8–10.5)
WBC # FLD AUTO: 6.69 K/UL — SIGNIFICANT CHANGE UP (ref 3.8–10.5)

## 2025-05-06 PROCEDURE — 47562 LAPAROSCOPIC CHOLECYSTECTOMY: CPT | Mod: AS

## 2025-05-06 PROCEDURE — 88304 TISSUE EXAM BY PATHOLOGIST: CPT | Mod: 26

## 2025-05-06 DEVICE — CLIP APPLIER COVIDIEN ENDOCLIP III 5MM: Type: IMPLANTABLE DEVICE | Site: ABDOMINAL | Status: FUNCTIONAL

## 2025-05-06 DEVICE — CLIP APPLIER COVIDIEN ENDOCLIP II 10MM MED/LG: Type: IMPLANTABLE DEVICE | Site: ABDOMINAL | Status: FUNCTIONAL

## 2025-05-06 RX ORDER — ACETAMINOPHEN 500 MG/5ML
1000 LIQUID (ML) ORAL ONCE
Refills: 0 | Status: COMPLETED | OUTPATIENT
Start: 2025-05-06 | End: 2025-05-06

## 2025-05-06 RX ORDER — HYDROMORPHONE/SOD CHLOR,ISO/PF 2 MG/10 ML
0.5 SYRINGE (ML) INJECTION
Refills: 0 | Status: DISCONTINUED | OUTPATIENT
Start: 2025-05-06 | End: 2025-05-06

## 2025-05-06 RX ORDER — SODIUM CHLORIDE 9 G/1000ML
1000 INJECTION, SOLUTION INTRAVENOUS
Refills: 0 | Status: DISCONTINUED | OUTPATIENT
Start: 2025-05-06 | End: 2025-05-06

## 2025-05-06 RX ORDER — ACETAMINOPHEN 500 MG/5ML
975 LIQUID (ML) ORAL EVERY 6 HOURS
Refills: 0 | Status: DISCONTINUED | OUTPATIENT
Start: 2025-05-06 | End: 2025-05-07

## 2025-05-06 RX ORDER — OXYCODONE HYDROCHLORIDE 30 MG/1
5 TABLET ORAL EVERY 4 HOURS
Refills: 0 | Status: DISCONTINUED | OUTPATIENT
Start: 2025-05-06 | End: 2025-05-07

## 2025-05-06 RX ORDER — FENTANYL CITRATE-0.9 % NACL/PF 100MCG/2ML
50 SYRINGE (ML) INTRAVENOUS
Refills: 0 | Status: DISCONTINUED | OUTPATIENT
Start: 2025-05-06 | End: 2025-05-06

## 2025-05-06 RX ORDER — ONDANSETRON HCL/PF 4 MG/2 ML
4 VIAL (ML) INJECTION ONCE
Refills: 0 | Status: DISCONTINUED | OUTPATIENT
Start: 2025-05-06 | End: 2025-05-06

## 2025-05-06 RX ADMIN — Medication 0.5 MILLIGRAM(S): at 20:32

## 2025-05-06 RX ADMIN — HEPARIN SODIUM 5000 UNIT(S): 1000 INJECTION INTRAVENOUS; SUBCUTANEOUS at 06:01

## 2025-05-06 RX ADMIN — SODIUM CHLORIDE 125 MILLILITER(S): 9 INJECTION, SOLUTION INTRAVENOUS at 21:38

## 2025-05-06 RX ADMIN — SODIUM CHLORIDE 125 MILLILITER(S): 9 INJECTION, SOLUTION INTRAVENOUS at 20:18

## 2025-05-06 RX ADMIN — Medication 400 MILLIGRAM(S): at 20:18

## 2025-05-06 RX ADMIN — Medication 4 MILLIGRAM(S): at 16:13

## 2025-05-06 RX ADMIN — Medication 0.5 MILLIGRAM(S): at 20:28

## 2025-05-06 RX ADMIN — Medication 400 MILLIGRAM(S): at 06:00

## 2025-05-06 RX ADMIN — OXYCODONE HYDROCHLORIDE 5 MILLIGRAM(S): 30 TABLET ORAL at 21:52

## 2025-05-06 RX ADMIN — Medication 100 MILLIGRAM(S): at 17:49

## 2025-05-06 RX ADMIN — Medication 1000 MILLIGRAM(S): at 06:52

## 2025-05-06 RX ADMIN — Medication 100 MILLIGRAM(S): at 06:01

## 2025-05-06 RX ADMIN — HEPARIN SODIUM 5000 UNIT(S): 1000 INJECTION INTRAVENOUS; SUBCUTANEOUS at 21:39

## 2025-05-06 RX ADMIN — Medication 1000 MILLIGRAM(S): at 20:50

## 2025-05-06 RX ADMIN — CEFTRIAXONE 100 MILLIGRAM(S): 500 INJECTION, POWDER, FOR SOLUTION INTRAMUSCULAR; INTRAVENOUS at 09:10

## 2025-05-06 RX ADMIN — OXYCODONE HYDROCHLORIDE 5 MILLIGRAM(S): 30 TABLET ORAL at 22:52

## 2025-05-06 RX ADMIN — Medication 0.5 MILLIGRAM(S): at 20:50

## 2025-05-06 RX ADMIN — Medication 0.5 MILLIGRAM(S): at 20:18

## 2025-05-06 RX ADMIN — HEPARIN SODIUM 5000 UNIT(S): 1000 INJECTION INTRAVENOUS; SUBCUTANEOUS at 15:44

## 2025-05-06 RX ADMIN — Medication 975 MILLIGRAM(S): at 23:07

## 2025-05-06 NOTE — PRE-OP CHECKLIST - DENTURES
[EENT/Resp Symptoms] : EENT/RESPIRATORY SYMPTOMS [GI Symptoms] : GI SYMPTOMS [de-identified] : Vomting, seen at [facility] and had 14.65 wbc, has fever, no diarrhea, got IV in the ER 2 nights ago. no

## 2025-05-06 NOTE — CHART NOTE - NSCHARTNOTEFT_GEN_A_CORE
Pt seen and examined at bedside. Pt offers no complaints at this time. Abdominal pain improved. Pt been NPO since admission.     Plan for lap francy today with Dr. Manzo. Consent form to be filled and placed in chart.

## 2025-05-06 NOTE — PRE-OP CHECKLIST - BP NONINVASIVE SYSTOLIC (MM HG)
Patient: Prashant Gutiérrez    Procedure Summary     Date: 02/25/21 Room / Location: [facility] OR 06 / [facility] MAIN OR    Anesthesia Start: 1007 Anesthesia Stop: 1151    Procedure: CIRCUMCISION REVISION, FLEXIBLE CYSTOSCOPY (N/A Penis) Diagnosis:     Surgeon: Ajay Lazar MD Provider: Stanislav Avila MD    Anesthesia Type: general ASA Status: 1          Anesthesia Type: general    Vitals  Vitals Value Taken Time   /46 02/25/21 1220   Temp 36.8 °C (98.2 °F) 02/25/21 1150   Pulse 92 02/25/21 1229   Resp 14 02/25/21 1220   SpO2 99 % 02/25/21 1229   Vitals shown include unvalidated device data.        Post Anesthesia Care and Evaluation    Patient location during evaluation: PACU  Patient participation: complete - patient participated  Level of consciousness: awake and alert  Pain management: adequate  Airway patency: patent  Anesthetic complications: No anesthetic complications    Cardiovascular status: acceptable  Respiratory status: acceptable  Hydration status: acceptable    Comments: --------------------            02/25/21               1220     --------------------   BP:       135/46     Pulse:     102       Resp:       14       Temp:                SpO2:      98%      --------------------      
125

## 2025-05-07 ENCOUNTER — TRANSCRIPTION ENCOUNTER (OUTPATIENT)
Age: 30
End: 2025-05-07

## 2025-05-07 VITALS
RESPIRATION RATE: 18 BRPM | HEART RATE: 62 BPM | TEMPERATURE: 98 F | DIASTOLIC BLOOD PRESSURE: 75 MMHG | OXYGEN SATURATION: 96 % | SYSTOLIC BLOOD PRESSURE: 134 MMHG

## 2025-05-07 LAB
ALBUMIN SERPL ELPH-MCNC: 3.3 G/DL — SIGNIFICANT CHANGE UP (ref 3.3–5)
ALP SERPL-CCNC: 61 U/L — SIGNIFICANT CHANGE UP (ref 40–120)
ALT FLD-CCNC: 51 U/L — SIGNIFICANT CHANGE UP (ref 12–78)
ANION GAP SERPL CALC-SCNC: 10 MMOL/L — SIGNIFICANT CHANGE UP (ref 5–17)
AST SERPL-CCNC: 53 U/L — HIGH (ref 15–37)
BASOPHILS # BLD AUTO: 0.01 K/UL — SIGNIFICANT CHANGE UP (ref 0–0.2)
BASOPHILS NFR BLD AUTO: 0.1 % — SIGNIFICANT CHANGE UP (ref 0–2)
BILIRUB DIRECT SERPL-MCNC: 0.1 MG/DL — SIGNIFICANT CHANGE UP (ref 0–0.3)
BILIRUB INDIRECT FLD-MCNC: 0.6 MG/DL — SIGNIFICANT CHANGE UP (ref 0.2–1)
BILIRUB SERPL-MCNC: 0.7 MG/DL — SIGNIFICANT CHANGE UP (ref 0.2–1.2)
BUN SERPL-MCNC: 9 MG/DL — SIGNIFICANT CHANGE UP (ref 7–23)
CALCIUM SERPL-MCNC: 8.5 MG/DL — SIGNIFICANT CHANGE UP (ref 8.5–10.1)
CHLORIDE SERPL-SCNC: 106 MMOL/L — SIGNIFICANT CHANGE UP (ref 96–108)
CO2 SERPL-SCNC: 20 MMOL/L — LOW (ref 22–31)
CREAT SERPL-MCNC: 0.68 MG/DL — SIGNIFICANT CHANGE UP (ref 0.5–1.3)
EGFR: 122 ML/MIN/1.73M2 — SIGNIFICANT CHANGE UP
EGFR: 122 ML/MIN/1.73M2 — SIGNIFICANT CHANGE UP
EOSINOPHIL # BLD AUTO: 0.01 K/UL — SIGNIFICANT CHANGE UP (ref 0–0.5)
EOSINOPHIL NFR BLD AUTO: 0.1 % — SIGNIFICANT CHANGE UP (ref 0–6)
GLUCOSE SERPL-MCNC: 125 MG/DL — HIGH (ref 70–99)
HCT VFR BLD CALC: 40.8 % — SIGNIFICANT CHANGE UP (ref 34.5–45)
HGB BLD-MCNC: 13.5 G/DL — SIGNIFICANT CHANGE UP (ref 11.5–15.5)
IMM GRANULOCYTES NFR BLD AUTO: 0.4 % — SIGNIFICANT CHANGE UP (ref 0–0.9)
LYMPHOCYTES # BLD AUTO: 1.02 K/UL — SIGNIFICANT CHANGE UP (ref 1–3.3)
LYMPHOCYTES # BLD AUTO: 12 % — LOW (ref 13–44)
MCHC RBC-ENTMCNC: 27.2 PG — SIGNIFICANT CHANGE UP (ref 27–34)
MCHC RBC-ENTMCNC: 33.1 G/DL — SIGNIFICANT CHANGE UP (ref 32–36)
MCV RBC AUTO: 82.3 FL — SIGNIFICANT CHANGE UP (ref 80–100)
MONOCYTES # BLD AUTO: 0.2 K/UL — SIGNIFICANT CHANGE UP (ref 0–0.9)
MONOCYTES NFR BLD AUTO: 2.4 % — SIGNIFICANT CHANGE UP (ref 2–14)
NEUTROPHILS # BLD AUTO: 7.2 K/UL — SIGNIFICANT CHANGE UP (ref 1.8–7.4)
NEUTROPHILS NFR BLD AUTO: 85 % — HIGH (ref 43–77)
NRBC BLD AUTO-RTO: 0 /100 WBCS — SIGNIFICANT CHANGE UP (ref 0–0)
PLATELET # BLD AUTO: 187 K/UL — SIGNIFICANT CHANGE UP (ref 150–400)
POTASSIUM SERPL-MCNC: 4 MMOL/L — SIGNIFICANT CHANGE UP (ref 3.5–5.3)
POTASSIUM SERPL-SCNC: 4 MMOL/L — SIGNIFICANT CHANGE UP (ref 3.5–5.3)
PROT SERPL-MCNC: 7.2 GM/DL — SIGNIFICANT CHANGE UP (ref 6–8.3)
RBC # BLD: 4.96 M/UL — SIGNIFICANT CHANGE UP (ref 3.8–5.2)
RBC # FLD: 13.2 % — SIGNIFICANT CHANGE UP (ref 10.3–14.5)
SODIUM SERPL-SCNC: 136 MMOL/L — SIGNIFICANT CHANGE UP (ref 135–145)
WBC # BLD: 8.47 K/UL — SIGNIFICANT CHANGE UP (ref 3.8–10.5)
WBC # FLD AUTO: 8.47 K/UL — SIGNIFICANT CHANGE UP (ref 3.8–10.5)

## 2025-05-07 PROCEDURE — 99239 HOSP IP/OBS DSCHRG MGMT >30: CPT

## 2025-05-07 RX ORDER — OXYCODONE HYDROCHLORIDE 30 MG/1
1 TABLET ORAL
Qty: 9 | Refills: 0
Start: 2025-05-07 | End: 2025-05-09

## 2025-05-07 RX ORDER — SIMETHICONE 80 MG
80 TABLET,CHEWABLE ORAL ONCE
Refills: 0 | Status: COMPLETED | OUTPATIENT
Start: 2025-05-07 | End: 2025-05-07

## 2025-05-07 RX ADMIN — OXYCODONE HYDROCHLORIDE 5 MILLIGRAM(S): 30 TABLET ORAL at 10:58

## 2025-05-07 RX ADMIN — Medication 80 MILLIGRAM(S): at 13:44

## 2025-05-07 RX ADMIN — Medication 975 MILLIGRAM(S): at 00:07

## 2025-05-07 RX ADMIN — Medication 2 MILLIGRAM(S): at 01:16

## 2025-05-07 RX ADMIN — HEPARIN SODIUM 5000 UNIT(S): 1000 INJECTION INTRAVENOUS; SUBCUTANEOUS at 13:33

## 2025-05-07 RX ADMIN — Medication 975 MILLIGRAM(S): at 05:55

## 2025-05-07 RX ADMIN — OXYCODONE HYDROCHLORIDE 5 MILLIGRAM(S): 30 TABLET ORAL at 11:28

## 2025-05-07 RX ADMIN — HEPARIN SODIUM 5000 UNIT(S): 1000 INJECTION INTRAVENOUS; SUBCUTANEOUS at 05:55

## 2025-05-07 RX ADMIN — Medication 2 MILLIGRAM(S): at 02:17

## 2025-05-07 RX ADMIN — Medication 40 MILLIGRAM(S): at 01:16

## 2025-05-07 RX ADMIN — Medication 975 MILLIGRAM(S): at 13:33

## 2025-05-07 NOTE — DISCHARGE NOTE PROVIDER - NSDCCPCAREPLAN_GEN_ALL_CORE_FT
PRINCIPAL DISCHARGE DIAGNOSIS  Diagnosis: Acute cholecystitis  Assessment and Plan of Treatment:       SECONDARY DISCHARGE DIAGNOSES  Diagnosis: Cholelithiasis  Assessment and Plan of Treatment:

## 2025-05-07 NOTE — DISCHARGE NOTE NURSING/CASE MANAGEMENT/SOCIAL WORK - FINANCIAL ASSISTANCE
[facility] provides services at a reduced cost to those who are determined to be eligible through [facility]’s financial assistance program. Information regarding [facility]’s financial assistance program can be found by going to [email or web address] or by calling [phone].

## 2025-05-07 NOTE — PROGRESS NOTE ADULT - ASSESSMENT
29F admitted w/ acute cholecystitis now POD#1 s/p laparoscopic cholecystectomy.  Patient endorses some continued epigastric pain similar to before surgery.    Plan:  - multimodal pain control PRN  - VTE ppx, OOB/AAT, IS  - low fat diet  - local wound care  - f/u AM labs
28 Y/O female POD 2 s/p laparoscopic cholecystectomy. Tolerating low fat diet.     PLAN:     - Clear for discharge from surgical standpoint   - Continue low fat diet   - DVT ppx; OOB/AAT   - IS  - Discussed with Dr. Manzo

## 2025-05-07 NOTE — DISCHARGE NOTE PROVIDER - NSDCMRMEDTOKEN_GEN_ALL_CORE_FT
ferrous sulfate 325 mg (65 mg elemental iron) oral tablet: 1 tab(s) orally once a day  Macrobid 100 mg oral capsule: 1 cap(s) orally once a day To start once a day after completion of Bactrim for suppression of UTIs during pregnancy  Pepcid 20 mg oral tablet: 1 tab(s) orally once a day   Prenatal Multivitamins with Folic Acid 1 mg oral tablet: 1 tab(s) orally once a day  sulfamethoxazole-trimethoprim 800 mg-160 mg oral tablet: 1 tab(s) orally 2 times a day   ferrous sulfate 325 mg (65 mg elemental iron) oral tablet: 1 tab(s) orally once a day  oxyCODONE 5 mg oral tablet: 1 tab(s) orally every 8 hours as needed for Moderate Pain (4 - 6) MDD: 3  Pepcid 20 mg oral tablet: 1 tab(s) orally once a day   Prenatal Multivitamins with Folic Acid 1 mg oral tablet: 1 tab(s) orally once a day   ferrous sulfate 325 mg (65 mg elemental iron) oral tablet: 1 tab(s) orally once a day  oxyCODONE 5 mg oral tablet: 1 tab(s) orally every 8 hours as needed for Moderate Pain (4 - 6) MDD: 3  oxyCODONE 5 mg oral tablet: 1 tab(s) orally every 8 hours as needed for  severe pain MDD: 3  Pepcid 20 mg oral tablet: 1 tab(s) orally once a day   Prenatal Multivitamins with Folic Acid 1 mg oral tablet: 1 tab(s) orally once a day

## 2025-05-07 NOTE — DISCHARGE NOTE PROVIDER - NSDCFUADDINST_GEN_ALL_CORE_FT
Drink plenty of fluids to prevent constipation and fruit juices that are high in vitamin C. Rest as needed.     Please take tylenol 1000mg and motrin 600mg every 6 hours for pain. Alternate between the two medications.  (Ex: ibuprofen at 9am, 3pm and 9pm and tylenol at 6am, 12pm and 6pm).   You have been prescribed Oxycodone for severe pain not managed with over the counter tylenol/motrin. Please take every 6 hours as needed. You may take even if you have taken Tylenol or Motrin.     You may shower and pat dry abdomen. Do not submerge wounds underwater. Please keep incisions clean and dry. Please do not scrub or rub incisions. Do not use lotion or powder on incisions.     Do not lift anything heavier than 10 pounds.  No heavy lifting or straining. Otherwise, you may return to your usual level of physical activity.     Return to your usual diet.    NOTIFY YOUR SURGEON IF YOU HAVE: any bleeding that does not stop, any pus draining from your wound(s), any fever (over 100.4 F) persistent nausea/vomiting, or if your pain is not controlled on your discharge pain medications, unable to urinate.    Please follow-up with your surgeon, within 2 weeks following discharge-call to schedule an appointment.  May take shower. Allow soap and water to run over incisions then pat dry.   Drink plenty of fluids to prevent constipation and fruit juices that are high in vitamin C. Rest as needed. Do not lift anything heavier than 10 pounds.   Please take Tylenol 650mg and Motrin 600mg every 6 hours and alternate between the two medications. You may take Ibuprofen 600mg every 6 hours, staggered with Tylenol 650mg every 6 hours for mild to moderate pain as needed. (Ex: Tylenol at 6am, 12pm and 6pm and Ibuprofen at 9am, 3pm and 9pm).  Narcotic medications should be used sparingly and only taken as prescribed for severe pain.   Call for any fever over 101F, nausea, vomiting, severe pain, no passing of gas or bowel movement.

## 2025-05-07 NOTE — DISCHARGE NOTE PROVIDER - HOSPITAL COURSE
29 year-old female with no pertinent past medical history admitted with acute cholecystitis. Patient was started on IV antibiotics and underwent laparoscopic cholecystectomy. Post-operative course was uncomplicated. Patient now tolerating diet, pain controlled with oral medication, ambulating independently, and stable for discharge home.

## 2025-05-07 NOTE — DISCHARGE NOTE NURSING/CASE MANAGEMENT/SOCIAL WORK - PATIENT PORTAL LINK FT
You can access the FollowMyHealth Patient Portal offered by [facility] by registering at the following website: [email or web address]. By joining [employer]’s FollowMyHealth portal, you will also be able to view your health information using other applications (apps) compatible with our system.

## 2025-05-07 NOTE — DISCHARGE NOTE PROVIDER - CARE PROVIDERS DIRECT ADDRESSES
[email or web address]

## 2025-05-07 NOTE — DISCHARGE NOTE NURSING/CASE MANAGEMENT/SOCIAL WORK - NSDCPEFALRISK_GEN_ALL_CORE
For information on Fall & Injury Prevention, visit: [email or web address] OR  [email or web address] OR  https://www.cdc.gov/steadi/patient.html

## 2025-05-07 NOTE — PROGRESS NOTE ADULT - SUBJECTIVE AND OBJECTIVE BOX
Patient seen and examined at bedside with no complaints.   Tolerating low fat diet.  Admits to flatus/BM.   Denies pain, nausea/ vomiting, chills, SOB, chest pain, calf tenderness.          Vital Signs Last 24 Hrs  T(F): 98.6 (05-07-25 @ 08:30), Max: 99 (05-06-25 @ 22:38)  HR: 61 (05-07-25 @ 08:30)  BP: 144/78 (05-07-25 @ 08:30)  RR: 17 (05-07-25 @ 08:30)  SpO2: 97% (05-07-25 @ 08:30)  Wt(kg): --   CAPILLARY BLOOD GLUCOSE          GENERAL: Alert, NAD  CHEST/LUNG: Respirations non labored, good inspiratory effort  HEART: S1S2  HEENT: NCAT, EOMI, conjunctiva clear   ABDOMEN: Obese abdomen, nondistended, + bowel sounds, appropriate incisional TTP; incision sites C/D/I   EXTREMITIES:  No calf tenderness, no edema    I&O's Detail      LABS:                        13.5   8.47  )-----------( 187      ( 07 May 2025 06:33 )             40.8     05-07    136  |  106  |  9   ----------------------------<  125[H]  4.0   |  20[L]  |  0.68    Ca    8.5      07 May 2025 06:33  Phos  3.4     05-06  Mg     1.9     05-06    TPro  7.2  /  Alb  3.3  /  TBili  0.7  /  DBili  0.1  /  AST  53[H]  /  ALT  51  /  AlkPhos  61  05-07    PT/INR - ( 06 May 2025 06:20 )   PT: 12.4 sec;   INR: 1.10 ratio         PTT - ( 06 May 2025 06:20 )  PTT:28.7 sec            
Postoperative Day #: 1 s/p laparoscopic cholecystectomy.  Patient seen and examined bedside.]  Endorsing continued epigastric pain similar to prior to surgery.  No flatus/BM.   Denies nausea and vomiting. Tolerated some water.  Denies chest pain, dyspnea, cough.    T(F): 98.1 (05-06-25 @ 23:30), Max: 99 (05-06-25 @ 22:38)  HR: 60 (05-06-25 @ 23:30) (50 - 64)  BP: 129/90 (05-06-25 @ 23:30) (107/92 - 137/80)  RR: 17 (05-06-25 @ 23:30) (11 - 20)  SpO2: 97% (05-06-25 @ 23:30) (95% - 99%)  Wt(kg): --  CAPILLARY BLOOD GLUCOSE      PHYSICAL EXAM:  General: NAD  Neuro:  Alert & responsive  HEENT: NCAT, EOMI, conjunctiva clear  CV: RRR  Lung: respirations nonlabored, good inspiratory effort  Abdomen: soft, appropriate incisional TTP, laparoscopic incisions x4 w/ sutures in place without active bleeding or drainage  Extremities: no pedal edema or calf tenderness noted     LABS:                        12.9   6.69  )-----------( 174      ( 06 May 2025 06:20 )             38.9     05-06    139  |  108  |  9   ----------------------------<  78  4.0   |  26  |  0.71    Ca    8.7      06 May 2025 06:20  Phos  3.4     05-06  Mg     1.9     05-06    TPro  6.5  /  Alb  3.2[L]  /  TBili  0.5  /  DBili  x   /  AST  22  /  ALT  27  /  AlkPhos  56  05-06    PT/INR - ( 06 May 2025 06:20 )   PT: 12.4 sec;   INR: 1.10 ratio         PTT - ( 06 May 2025 06:20 )  PTT:28.7 sec

## 2025-05-07 NOTE — DISCHARGE NOTE PROVIDER - NSDCFUADDAPPT_GEN_ALL_CORE_FT
Please call to schedule a follow up appointment within 2 weeks. Please call outpatient office to schedule follow-up appointment in 2 weeks following discharge from hospital.     NOTIFY YOUR SURGEON IF: You have any bleeding that does not stop, any pus draining from your wound, any fever (over 100.4 F) or chills, persistent nausea/vomiting, persistent diarrhea, or if your pain is not controlled on your discharge pain medications.

## 2025-05-07 NOTE — DISCHARGE NOTE PROVIDER - CARE PROVIDER_API CALL
Berkley Manzo  Surgery  [address] [place] [id number]  Phone: [phone]  Fax: [phone]  Follow Up Time: 2 weeks

## 2025-05-07 NOTE — DISCHARGE NOTE NURSING/CASE MANAGEMENT/SOCIAL WORK - NSDCFUADDAPPT_GEN_ALL_CORE_FT
Please call outpatient office to schedule follow-up appointment in 2 weeks following discharge from hospital.     NOTIFY YOUR SURGEON IF: You have any bleeding that does not stop, any pus draining from your wound, any fever (over 100.4 F) or chills, persistent nausea/vomiting, persistent diarrhea, or if your pain is not controlled on your discharge pain medications.

## 2025-05-08 LAB — SURGICAL PATHOLOGY STUDY: SIGNIFICANT CHANGE UP

## 2025-05-13 DIAGNOSIS — K80.00 CALCULUS OF GALLBLADDER WITH ACUTE CHOLECYSTITIS WITHOUT OBSTRUCTION: ICD-10-CM

## 2025-05-26 NOTE — ED ADULT NURSE NOTE - CHPI ED NUR SYMPTOMS POS
Chief Complaint:   This is a 20 year old male patient presenting for palpitations     HPI:  20/M patient presents for evaluation of palpitations.    He has been experiencing irregular heart rhythms/palpitations for the past few days, characterized by episodes of tachycardia interspersed with normal heart rates. He also reports occasional skipped beats, which are followed by a return to normal rhythm. Accompanying these symptoms are shortness of breath and an odd sensation in his chest. He describes his chest sensation as unusual rather than painful.The frequency of these episodes has increased, prompting his visit today. He does not have any known medical conditions and is not on any regular medication, including over-the-counter drugs or supplements. His daily routine includes morning tea consumption, but he abstains from alcohol and illicit substances such as cocaine, marijuana, and heroin. He does not engage in regular physical activity and has not identified any specific triggers or alleviating factors for his palpitations. His sleep pattern is generally good, although he acknowledges a high level of anxiety and stress.     PAST MEDICAL HISTORY:  Past Medical History:   Diagnosis Date    No known problems         PAST SURGICAL HISTORY:  Past Surgical History:   Procedure Laterality Date    Appendectomy N/A 11/11/2024    ROBOT-ASSISTED APPENDECTOMY --- Dr. Aditya Lyn        PAST SOCIAL HISTORY   Social History     Socioeconomic History    Marital status: Single     Spouse name: Not on file    Number of children: Not on file    Years of education: Not on file    Highest education level: Not on file   Occupational History    Not on file   Tobacco Use    Smoking status: Never     Passive exposure: Never    Smokeless tobacco: Never   Vaping Use    Vaping status: never used   Substance and Sexual Activity    Alcohol use: Never    Drug use: Never    Sexual activity: Not on file   Other Topics Concern    Not on file  PAIN   Social History Narrative    Not on file     Social Drivers of Health     Financial Resource Strain: Not on file   Food Insecurity: No Food Insecurity (12/31/2024)    [facility]    Hunger Vital Sign     Worried About Running Out of Food in the Last Year: Never true     Ran Out of Food in the Last Year: Never true   Transportation Needs: Not on file   Physical Activity: Not on file   Stress: Not on file   Social Connections: Not on file   Feeling safe in your relationship: Not on file      Allergies  ALLERGIES:  Patient has no known allergies.    Home Medications  NONE      Physical Exam  ED Triage Vitals [05/26/25 1449]   Encounter Vitals Group      /69      Systolic BP Percentile       Diastolic BP Percentile       Heart Rate 77      Resp 18      Temp 98 °F (36.7 °C)      Temp src Oral      SpO2 98 %      Weight       Height 6' (1.829 m)      Head Circumference       Peak Flow       Pain Score       Pain Loc       Pain Education       Exclude from Growth Chart           Physical Exam  Vitals and nursing note reviewed.   Constitutional:       General: He is not in acute distress.     Appearance: He is well-developed and normal weight. He is not ill-appearing, toxic-appearing or diaphoretic.   HENT:      Head: Normocephalic and atraumatic.      Neck: Normal range of motion and neck supple.   Eyes:      Extraocular Movements: Extraocular movements intact.      Pupils: Pupils are equal, round, and reactive to light.   Neck:      Comments: Normal Thyroid   Cardiovascular:      Rate and Rhythm: Normal rate and regular rhythm.      Heart sounds: Normal heart sounds.   Pulmonary:      Effort: Pulmonary effort is normal.   Musculoskeletal:         General: Normal range of motion.   Skin:     General: Skin is warm and dry.   Neurological:      Mental Status: He is alert and oriented to person, place, and time.   Psychiatric:         Attention and Perception: Attention and  perception normal.         Mood and Affect: Affect is flat.         Speech: Speech normal.         Behavior: Behavior is cooperative.         Thought Content: Thought content normal.         ------------------  ECG Interpretation: EKG performed shows normal sinus rhythm at 76 bpm.  Normal EKG.  No prior EKG eval for comparison    Cardiac Monitor at 1625 shows normal sinus rhythm at 85 bpm    Pulse Ox at 1625 shows an SpO2 of 100% on room air with good waveform    Above independently interpreted by myself the ED physician  -----------------------  Labwork:  Admission on 05/26/2025, Discharged on 05/26/2025   Component Date Value Ref Range Status    Ventricular Rate EKG/Min (BPM) 05/26/2025 76   Preliminary    Atrial Rate (BPM) 05/26/2025 76   Preliminary    NJ-Interval (MSEC) 05/26/2025 122   Preliminary    QRS-Interval (MSEC) 05/26/2025 90   Preliminary    QT-Interval (MSEC) 05/26/2025 336   Preliminary    QTc 05/26/2025 378   Preliminary    P Axis (Degrees) 05/26/2025 61   Preliminary    R Axis (Degrees) 05/26/2025 81   Preliminary    T Axis (Degrees) 05/26/2025 42   Preliminary    REPORT TEXT 05/26/2025    Preliminary                    Value:Normal sinus rhythm  with sinus arrhythmia  Normal ECG  No previous ECGs available      Sodium 05/26/2025 138  135 - 145 mmol/L Final    Potassium 05/26/2025 3.9  3.4 - 5.1 mmol/L Final    Slight hemolysis, result may be falsely increased.    Chloride 05/26/2025 108  97 - 110 mmol/L Final    Carbon Dioxide 05/26/2025 25  21 - 32 mmol/L Final    Anion Gap 05/26/2025 9  7 - 19 mmol/L Final    Glucose 05/26/2025 122 (H)  70 - 99 mg/dL Final    BUN 05/26/2025 12  6 - 20 mg/dL Final    Creatinine 05/26/2025 0.85  0.67 - 1.17 mg/dL Final    Glomerular Filtration Rate 05/26/2025 >90  >=60 Final    eGFR results = or >60 mL/min/1.73m2 = Normal kidney function. Estimated GFR calculated using the CKD-EPI-R (2021) equation that does not include race in the creatinine calculation.     BUN/Cr 05/26/2025 14  7 - 25 Final    Calcium 05/26/2025 8.8  8.4 - 10.2 mg/dL Final    Bilirubin, Total 05/26/2025 0.6  0.2 - 1.0 mg/dL Final    GOT/AST 05/26/2025 24  <=37 Units/L Final    Slight hemolysis, result may be falsely increased.    GPT/ALT 05/26/2025 29  10 - 50 Units/L Final    Alkaline Phosphatase 05/26/2025 86  55 - 220 Units/L Final    Albumin 05/26/2025 4.1  3.4 - 5.0 g/dL Final    Protein, Total 05/26/2025 7.5  6.4 - 8.2 g/dL Final    Globulin 05/26/2025 3.4  2.0 - 4.0 g/dL Final    A/G Ratio 05/26/2025 1.2  1.0 - 2.4 Final    Troponin I, High Sensitivity 05/26/2025 8  <77 ng/L Final    WBC 05/26/2025 4.9  4.2 - 11.0 K/mcL Final    RBC 05/26/2025 5.32  4.50 - 5.90 mil/mcL Final    HGB 05/26/2025 15.8  13.0 - 17.0 g/dL Final    HCT 05/26/2025 46.7  39.0 - 51.0 % Final    MCV 05/26/2025 87.8  78.0 - 100.0 fl Final    MCH 05/26/2025 29.7  26.0 - 34.0 pg Final    MCHC 05/26/2025 33.8  32.0 - 36.5 g/dL Final    RDW-CV 05/26/2025 12.3  11.0 - 15.0 % Final    RDW-SD 05/26/2025 39.9  39.0 - 50.0 fL Final    PLT 05/26/2025 212  140 - 450 K/mcL Final    NRBC 05/26/2025 0  <=0 /100 WBC Final    Neutrophil, Percent 05/26/2025 56  % Final    Lymphocytes, Percent 05/26/2025 29  % Final    Mono, Percent 05/26/2025 11  % Final    Eosinophils, Percent 05/26/2025 3  % Final    Basophils, Percent 05/26/2025 1  % Final    Immature Granulocytes 05/26/2025 0  % Final    Absolute Neutrophils 05/26/2025 2.7  1.8 - 8.0 K/mcL Final    Absolute Lymphocytes 05/26/2025 1.4  1.2 - 5.2 K/mcL Final    Absolute Monocytes 05/26/2025 0.5  0.3 - 0.9 K/mcL Final    Absolute Eosinophils  05/26/2025 0.2  0.0 - 0.5 K/mcL Final    Absolute Basophils 05/26/2025 0.1  0.0 - 0.3 K/mcL Final    Absolute Immature Granulocytes 05/26/2025 0.0  0.0 - 0.2 K/mcL Final    D Dimer, Quantitative 05/26/2025 0.20  <0.57 mg/L (FEU) Final    Magnesium 05/26/2025 2.0  1.7 - 2.4 mg/dL Final    Slight hemolysis, result may be falsely increased.    TSH  05/26/2025 0.825  0.463 - 4.130 mcUnits/mL Final          Imaging:  PORTABLE CHEST RADIOGRAPH DATED 5/26/2025     CLINICAL INDICATION: Palpitations. Chest pain. Shortness of breath.     COMPARISON: 8/4/2010     FINDINGS:     AP portable upright view of the chest was obtained. Cardiac size is within  normal limits. Mediastinal contour appears within normal limits. No focal  infiltrate is identified. There is no evidence of pleural effusion or  pneumothorax.     IMPRESSION:     No focal consolidation or pneumothorax.      Medical Decision Making  Patient was seen in ED bed 8.    IV established.  Placed on continuous cardiorespiratory monitoring.  EKG normal.  Chest x-ray normal with no focal consolidation or pneumothorax.  Labs nonacute including CBC which shows no white count, left shift, or bandemia.  H&H and platelets normal.  CMP shows normal electrolytes, glucose, renal function, liver function.  Magnesium level normal.  TSH normal at 0.8.  Troponin negative.  Dimer negative.  No clinical solution for DVT, PE, ACS, dissection, aneurysm.  No clinical suspicion for atherosclerosis.  Heart score is 0.  Low risk.    His EKG, chest x-ray, and blood tests are all normal. He has palpitations with normal labs and a normal EKG. His vital signs are within normal limits, and he does not present any risk factors. The palpitations could be attributed to a lack of regular physical activity, heightened anxiety or stress levels, and consumption of caffeinated products. He was advised to incorporate a 30-minute walk into his daily routine, reduce or eliminate caffeine intake, manage anxiety and stress levels, and ensure adequate sleep. He was informed that cessation of caffeine may initially result in increased palpitations, stress, or headaches, but these symptoms should subside after 48 hours. A follow-up with his primary care physician, Dr. Dominguez, was recommended for further evaluation and potential use of event monitors if  the current management strategies prove ineffective.    Amount and/or Complexity of Data Reviewed  External Data Reviewed: notes.     Details: PCP NOTES IN EPIC   Labs: ordered. Decision-making details documented in ED Course.  Radiology: ordered and independent interpretation performed. Decision-making details documented in ED Course.  ECG/medicine tests: ordered and independent interpretation performed. Decision-making details documented in ED Course.    Risk  Risk Details: .HEART Score  History: Slightly suspicious  EKG: Normal  Age: Less than or equal to 45  Risk Factors: No risk factors known  Troponin: Equal or less than normal limit  HEART Score Total : 0                   Final Diagnosis  ED Diagnosis       Diagnosis Comment Associated Orders       Final diagnosis    Palpitations -- --                        Edna Urrutia DO  05/26/25 1828

## (undated) DEVICE — TUBING STRYKER PNEUMOCLEAR HIGH FLOW HEATED

## (undated) DEVICE — ENDOCATCH 10MM

## (undated) DEVICE — DURABLE MEDICAL EQUIPMENT: Type: DURABLE MEDICAL EQUIPMENT

## (undated) DEVICE — DRSG STERISTRIPS 0.5 X 4"

## (undated) DEVICE — TROCAR COVIDIEN VERSAONE BLADELESS FIXATION 11MM STANDARD

## (undated) DEVICE — INSUFFLATION NDL COVIDIEN STEP 14G FOR STEP/VERSASTEP

## (undated) DEVICE — VENODYNE/SCD SLEEVE CALF MEDIUM

## (undated) DEVICE — SUT VICRYL PLUS 4-0 18" PS-2 UNDYED

## (undated) DEVICE — SHEARS COVIDIEN ENDO SHEAR 5MM X 31CM W UNIPOLAR CAUTERY

## (undated) DEVICE — DRSG ALLEVYN LIFE 3X3"

## (undated) DEVICE — DRAPE 3/4 SHEET W REINFORCEMENT 56X77"

## (undated) DEVICE — GLV 7.5 PROTEXIS (WHITE)

## (undated) DEVICE — BLADE SURGICAL #15 CARBON

## (undated) DEVICE — SYR LUER LOK 50CC

## (undated) DEVICE — TROCAR COVIDIEN VERSAONE BLADELESS FIXATION 5MM STANDARD

## (undated) DEVICE — PACK GENERAL LAPAROSCOPY

## (undated) DEVICE — TROCAR COVIDIEN VERSASTEP PLUS 11MM STANDARD

## (undated) DEVICE — DRSG MASTISOL

## (undated) DEVICE — DISSECTOR ENDOSCOPIC KITTNER SINGLE TIP

## (undated) DEVICE — SUT POLYSORB 0 30" GU-46

## (undated) DEVICE — TUBING STRYKEFLOW II SUCTION / IRRIGATOR

## (undated) DEVICE — DRSG 2X2

## (undated) DEVICE — WARMING BLANKET UPPER ADULT

## (undated) DEVICE — TROCAR COVIDIEN VERSASTEP 5MM STANDARD

## (undated) DEVICE — SOL IRR BAG NS 0.9% 3000ML